# Patient Record
Sex: MALE | Race: WHITE | NOT HISPANIC OR LATINO | Employment: OTHER | URBAN - METROPOLITAN AREA
[De-identification: names, ages, dates, MRNs, and addresses within clinical notes are randomized per-mention and may not be internally consistent; named-entity substitution may affect disease eponyms.]

---

## 2017-07-17 ENCOUNTER — PATIENT MESSAGE (OUTPATIENT)
Dept: PSYCHIATRY | Facility: CLINIC | Age: 82
End: 2017-07-17

## 2017-07-18 RX ORDER — ESCITALOPRAM OXALATE 20 MG/1
20 TABLET ORAL DAILY
Qty: 30 TABLET | Refills: 1 | Status: SHIPPED | OUTPATIENT
Start: 2017-07-18 | End: 2017-09-13 | Stop reason: SDUPTHER

## 2017-07-18 RX ORDER — ALPRAZOLAM 0.5 MG/1
0.5 TABLET ORAL 2 TIMES DAILY PRN
Qty: 60 TABLET | Refills: 1 | Status: SHIPPED | OUTPATIENT
Start: 2017-07-18 | End: 2017-09-13 | Stop reason: SDUPTHER

## 2017-07-18 RX ORDER — ESCITALOPRAM OXALATE 20 MG/1
TABLET ORAL
Qty: 90 TABLET | Refills: 1 | OUTPATIENT
Start: 2017-07-18

## 2017-09-13 ENCOUNTER — HOSPITAL ENCOUNTER (OUTPATIENT)
Dept: CARDIOLOGY | Facility: CLINIC | Age: 82
Discharge: HOME | End: 2017-09-13
Payer: COMMERCIAL

## 2017-09-13 ENCOUNTER — OFFICE VISIT (OUTPATIENT)
Dept: PSYCHIATRY | Facility: CLINIC | Age: 82
End: 2017-09-13
Payer: COMMERCIAL

## 2017-09-13 VITALS
WEIGHT: 151 LBS | SYSTOLIC BLOOD PRESSURE: 172 MMHG | HEIGHT: 72 IN | HEART RATE: 82 BPM | BODY MASS INDEX: 20.45 KG/M2 | DIASTOLIC BLOOD PRESSURE: 87 MMHG

## 2017-09-13 DIAGNOSIS — F41.9 ANXIETY DISORDER, UNSPECIFIED TYPE: ICD-10-CM

## 2017-09-13 DIAGNOSIS — F33.1 MDD (MAJOR DEPRESSIVE DISORDER), RECURRENT EPISODE, MODERATE: ICD-10-CM

## 2017-09-13 DIAGNOSIS — Z79.899 MEDICATION MANAGEMENT: ICD-10-CM

## 2017-09-13 DIAGNOSIS — F41.1 GAD (GENERALIZED ANXIETY DISORDER): ICD-10-CM

## 2017-09-13 DIAGNOSIS — F33.1 MDD (MAJOR DEPRESSIVE DISORDER), RECURRENT EPISODE, MODERATE: Primary | ICD-10-CM

## 2017-09-13 PROCEDURE — 99214 OFFICE O/P EST MOD 30 MIN: CPT | Mod: S$GLB,,, | Performed by: PSYCHIATRY & NEUROLOGY

## 2017-09-13 PROCEDURE — 99999 PR PBB SHADOW E&M-EST. PATIENT-LVL III: CPT | Mod: PBBFAC,,, | Performed by: PSYCHIATRY & NEUROLOGY

## 2017-09-13 PROCEDURE — 93000 ELECTROCARDIOGRAM COMPLETE: CPT | Mod: S$GLB,,, | Performed by: INTERNAL MEDICINE

## 2017-09-13 PROCEDURE — 3008F BODY MASS INDEX DOCD: CPT | Mod: S$GLB,,, | Performed by: PSYCHIATRY & NEUROLOGY

## 2017-09-13 PROCEDURE — 1159F MED LIST DOCD IN RCRD: CPT | Mod: S$GLB,,, | Performed by: PSYCHIATRY & NEUROLOGY

## 2017-09-13 RX ORDER — MIRTAZAPINE 15 MG/1
15 TABLET, FILM COATED ORAL NIGHTLY
Qty: 30 TABLET | Refills: 3 | Status: SHIPPED | OUTPATIENT
Start: 2017-09-13 | End: 2017-11-30 | Stop reason: SDUPTHER

## 2017-09-13 RX ORDER — ALPRAZOLAM 0.5 MG/1
0.5 TABLET ORAL 2 TIMES DAILY PRN
Qty: 60 TABLET | Refills: 3 | Status: SHIPPED | OUTPATIENT
Start: 2017-09-13 | End: 2020-05-28

## 2017-09-13 RX ORDER — ESCITALOPRAM OXALATE 20 MG/1
20 TABLET ORAL DAILY
Qty: 90 TABLET | Refills: 1 | Status: SHIPPED | OUTPATIENT
Start: 2017-09-13 | End: 2017-11-30 | Stop reason: SDUPTHER

## 2017-09-13 NOTE — PATIENT INSTRUCTIONS
Start Remeron (mirtazapine) 15 mg one tablet at bedtime.  Continue Lexapro 20 mg one tablet daily.  You may use Xanax 0.5 mg up to twice daily as needed for anxiety or insomnia.  Return to clinic in about 2 months for follow up appt.

## 2017-09-13 NOTE — PROGRESS NOTES
"Outpatient Psychiatry Follow-Up Visit (MD/NP)    9/13/2017    Clinical Status of Patient:  Outpatient (Ambulatory)    Session Length:  30 minutes (E&M)      Chief Complaint:  Aleksandra Jacques is a 82 y.o. male who presents today for follow-up of anxiety, panic attacks, depression.    Met with patient and spouse.      Interval History and Content of Current Session:  Interim Events/Subjective Report/Content of Current Session: First appointment with me since 3/2/2015 (> 2 years).    "I am doing OK".  He has been taking Lexapro 20 mg/day in AM -- denies AE's to his knowledge.  He had tapered and stopped the Lexapro just after he saw me last in 2015.  He restarted it about 8 weeks ago because he felt his depression was getting worse.     He had contacted me recently about fact he has not been sleeping through the night.  I told him he could take an extra dose of Xanax in the early AM hours if needed.  He has been taking Xanax 0.5 mg one tablet before bedtime and second one around 3 AM.  It takes him about 1/2 hour to resume sleep.  He is able to go back to sleep for a couple of hours.  He denies feeling excessively drowsy when he rises in the AM around 5 - 6 AM.  So, he has had to take 2 Xanax tablets every night.    He has some periods of near tearfulness, but not actual crying spells (wife has even encouraged him to cry at times).  They deny excessive irritability or anger.     Current SIGECAPS:    Sleep -- as above  Interests -- "just what I have to do"; will do some odd jobs around the house.  This year he hired someone to come over to do his lawn.      Guilt -- "I can't think about anything specific"    Energy -- "pretty good"; wife thinks he is often tired and she must often push him to do things    Concentration -- "all right"   Appetite -- "a little low"; 16# wt loss since 3/2015.   Psychomotor -- somewhat retarded    Suicidal ideation -- denies    He wants to live for his wife, children and grandchildren.  " "Their grandchildren range from 15 - 21.  Wife and pt like to take cruises.  They try to go at least once a year on a cruise.    He realizes he has some ST memory loss.  Pt and wife do not see it as much of a problem at this time.   Wife states he does some callisthenics daily and they walk a couple of times a week.    Finances are OK.  Bills are being paid.      Also, when discussing most recent EKG, pt stated he had "skipped" heartbeat in 2014, which is why he had the EKG done in the ED.  His brother was suffering from heart disease and told pt about his Sx.  Pt stated he then realized he was having some of these Sx (occasional skipped heartbeat), so the went to ED.  He states the doctor in the ED told him his EKG was fine (he actually had 2 PVC's noted in 6 recorded beats on the EKG).  Pt DENIES having ANY recent Sx that would be concerning for any underlying heart disease.    His brother was also a lifelong smoker; pt has never smoked.     Psychotherapy:  N/A        Review of Systems   · PSYCHIATRIC: Pertinant items are noted in the narrative.  · CONSTITUTIONAL: Slight recent weight loss.   · MUSCULOSKELETAL: No significant pain or stiffness of the joints.  · NEUROLOGIC: No weakness, sensory changes, seizures, confusion, memory loss, tremor or other abnormal movements.  · ENDOCRINE: No polydipsia or polyuria.  · INTEGUMENTARY: No rashes or lacerations.  · EYES: No exophthalmos, jaundice or blindness.  · ENT: No dizziness, tinnitus or hearing loss.  · RESPIRATORY: No shortness of breath or dyspnea on exertion.  · CARDIOVASCULAR: No tachycardia, chest pain, recent palpitations/"skipped" heart beats.  · GASTROINTESTINAL: No nausea, vomiting, pain, constipation or diarrhea.  · GENITOURINARY: No frequency, dysuria.      Past Medical, Family and Social History: The patient's past medical, family and social history have been reviewed and updated as appropriate within the electronic medical record -- see encounter notes " "including my initial eval from 7/2/2014.    Compliance:  Yes.       Side effects: None    Risk Parameters:  Patient reports no suicidal ideation  Patient reports no homicidal ideation  Patient reports no self-injurious behavior  Patient reports no violent behavior    Exam (detailed: at least 9 elements; comprehensive: all 15 elements)   Constitutional  Vitals:  Most recent vital signs, dated less than 90 days prior to this appointment, were reviewed.     Vitals - 1 value per visit 9/2/2014 3/2/2015 9/13/2017   SYSTOLIC 153* 156* 172*   DIASTOLIC 71 76 87   PULSE 84 82 82   TEMPERATURE      RESPIRATIONS      SPO2      Weight (lb) 161 167 151   Weight (kg) 73.029 75.751 68.493   HEIGHT 6' 0" 6' 0" 6' 0"   BODY MASS INDEX 21.83 22.64 20.48   VISIT REPORT      Pain Score         *pt states he thinks he has "white coat hypertension"; his blood pressures have always been higher compared to outside the physicians' office     General:  unremarkable, age appropriate, normal weight, well dressed, neatly groomed, friendly, cooperative, good eye contact, engaging     Musculoskeletal  Muscle Strength/Tone:  not examined   Gait & Station:  non-ataxic     Psychiatric  Speech:  no latency; no press, spontaneous, good articulation   Mood & Affect:  euthymic  congruent and appropriate   Thought Process:  goal-directed, logical   Associations:  intact   Thought Content:  normal, no suicidality, no homicidality, delusions, or paranoia   Insight:  intact   Judgement: behavior is adequate to circumstances   Orientation:  grossly intact   Memory: intact for content of interview   Language: grossly intact   Attention Span & Concentration:  able to focus   Fund of Knowledge:  intact and appropriate to age and level of education     Assessment and Diagnosis   Status/Progress: Based on the examination today, the patient's problem(s) is/are improved.  New problems have been presented today.   Co-morbidities are not complicating management of " the primary condition.  There are no active rule-out diagnoses for this patient at this time.     Impression:   Major Depressive Disorder, recurrent, moderate (not responding adequately to Lexapro at therapeutic dose)  Generalized Anxiety Disorder     Intervention/Counseling/Treatment Plan   Medication Management:   (Re-)Start Remeron (mirtazapine) 15 mg one tablet at bedtime.  Continue Lexapro 20 mg one tablet daily.  Pt may use Xanax 0.5 mg up to twice daily as needed for anxiety or insomnia.  Pt told to avoid using unless necessary.   Return to clinic in about 2 months for follow up appt.     Additional Note:  Pt needs an EKG based on report from 2014 and being on Lexapro.  EKG ordered -- will direct pt to have this test done today and will contact pt later with the results.    Pt also needs to see Dr. Armaan Weiss for routine medical checkup.  Has not seen PCP in over 3 years.     Return to Clinic: 6 months, or sooner prn.

## 2017-09-14 RX ORDER — MIRTAZAPINE 15 MG/1
TABLET, FILM COATED ORAL
Qty: 90 TABLET | Refills: 3 | OUTPATIENT
Start: 2017-09-14

## 2017-11-30 ENCOUNTER — OFFICE VISIT (OUTPATIENT)
Dept: PSYCHIATRY | Facility: CLINIC | Age: 82
End: 2017-11-30
Payer: COMMERCIAL

## 2017-11-30 VITALS
DIASTOLIC BLOOD PRESSURE: 79 MMHG | SYSTOLIC BLOOD PRESSURE: 167 MMHG | BODY MASS INDEX: 21.92 KG/M2 | HEIGHT: 72 IN | HEART RATE: 88 BPM | WEIGHT: 161.81 LBS

## 2017-11-30 DIAGNOSIS — F33.1 MDD (MAJOR DEPRESSIVE DISORDER), RECURRENT EPISODE, MODERATE: ICD-10-CM

## 2017-11-30 DIAGNOSIS — F41.1 GENERALIZED ANXIETY DISORDER: ICD-10-CM

## 2017-11-30 DIAGNOSIS — F33.0 MDD (MAJOR DEPRESSIVE DISORDER), RECURRENT EPISODE, MILD: Primary | ICD-10-CM

## 2017-11-30 PROCEDURE — 99999 PR PBB SHADOW E&M-EST. PATIENT-LVL III: CPT | Mod: PBBFAC,,, | Performed by: PSYCHIATRY & NEUROLOGY

## 2017-11-30 PROCEDURE — 99214 OFFICE O/P EST MOD 30 MIN: CPT | Mod: S$GLB,,, | Performed by: PSYCHIATRY & NEUROLOGY

## 2017-11-30 RX ORDER — ESCITALOPRAM OXALATE 20 MG/1
20 TABLET ORAL DAILY
Qty: 90 TABLET | Refills: 1 | Status: SHIPPED | OUTPATIENT
Start: 2017-11-30 | End: 2018-06-04 | Stop reason: SDUPTHER

## 2017-11-30 RX ORDER — MIRTAZAPINE 7.5 MG/1
7.5 TABLET, FILM COATED ORAL NIGHTLY
Qty: 90 TABLET | Refills: 1 | Status: SHIPPED | OUTPATIENT
Start: 2017-11-30 | End: 2020-05-28

## 2017-11-30 NOTE — PROGRESS NOTES
"Outpatient Psychiatry Follow-Up Visit (MD/NP)    11/30/2017    Clinical Status of Patient:  Outpatient (Ambulatory)    Session Length:  30 minutes (E&M)      Chief Complaint:  Aleksandra Jacques is a 82 y.o. male who presents today for follow-up of anxiety, panic attacks, depression.    Met with patient and spouse.      Interval History and Content of Current Session:  Interim Events/Subjective Report/Content of Current Session: First appointment with me since 9/13/2017.   Medication plan at last appt:  "(Re-)Start Remeron (mirtazapine) 15 mg one tablet at bedtime.  Continue Lexapro 20 mg one tablet daily.  Pt may use Xanax 0.5 mg up to twice daily as needed for anxiety or insomnia.  Pt told to avoid using unless necessary.   Return to clinic in about 2 months for follow up appt."     He states he restarted the mirtazapine.  He has been taking just 1/4 tablet (3.75 mg) around 9 pm.  He is able to fall asleep and sleeps until around 5 am.  He and his wife attend [employer] every weekday at 6:15 am.  They do not need to get up as early on the weekends, as they attend the [employer] service in the evening.    He denies being oversedated in the AM if he takes just 1/4 of a 15 mg tablet.     He and his wife state he has been feeling better overall.  He denies any AE's to the mirtazapine besides being slightly more drowsy when awakening, but this passes fairly quickly.    He has NOT had to take any Xanax since his last visit with me on 9/13/17.     He has been staying busy; he is currently completing the repainting of his house.      They have a 9 day cruise scheduled next week to the [facility].  They are both looking forward to it.      He and his wife state his blood pressures OUTSIDE physicians' offices have been running lower -- he usually takes it in the AM's when he is relaxed.      Current SIGECAPS:    Sleep -- improved; needs less Xanax to help relax and sleep  Interests -- more motivated; he will do some odd jobs around " "the house.  This year he hired someone to come over to do his lawn.      Guilt -- denies excessive guilt    Energy -- "pretty good"; less tired overall   Concentration -- "all right"   Appetite -- "a little low"; 16# wt loss since 3/2015.   Psychomotor -- normal for age    Suicidal ideation -- denies hopelessness or SI  He wants to live for his wife, children and grandchildren.  Their grandchildren range are in their teens and twenties.  Wife and pt like to take cruises.  They try to go at least once a year on a cruise.    He realizes he has some ST memory loss.  Pt and wife do not see it as much of a problem at this time.   Wife states he does some callisthenics daily and they walk a couple of times a week.    Finances are OK.  Bills are being paid on time.      Psychotherapy:  N/A        Review of Systems   · PSYCHIATRIC: Pertinant items are noted in the narrative.  · CONSTITUTIONAL: Slight recent weight gain.   · MUSCULOSKELETAL: No significant pain or stiffness of the joints.  · NEUROLOGIC: No weakness, sensory changes, seizures, confusion, memory loss, tremor or other abnormal movements.  · ENDOCRINE: No polydipsia or polyuria.  · INTEGUMENTARY: No rashes or lacerations.  · EYES: No exophthalmos, jaundice or blindness.  · ENT: No dizziness, tinnitus or hearing loss.  · RESPIRATORY: No shortness of breath or dyspnea on exertion.  · CARDIOVASCULAR: No tachycardia, chest pain, recent palpitations/"skipped" heart beats.  · GASTROINTESTINAL: No nausea, vomiting, pain, constipation or diarrhea.  · GENITOURINARY: No frequency, dysuria.      Past Medical, Family and Social History: The patient's past medical, family and social history have been reviewed and updated as appropriate within the electronic medical record -- see encounter notes including my initial eval from 7/2/2014.    Compliance:  Yes.       Side effects: None    Risk Parameters:  Patient reports no suicidal ideation  Patient reports no homicidal " "ideation  Patient reports no self-injurious behavior  Patient reports no violent behavior    Exam (detailed: at least 9 elements; comprehensive: all 15 elements)   Constitutional  Vitals:  Most recent vital signs, dated less than 90 days prior to this appointment, were reviewed.     Vitals - 1 value per visit 3/2/2015 9/13/2017 11/30/2017   SYSTOLIC 156* 172* 167*   DIASTOLIC 76 87 79   PULSE 82 82 88   TEMPERATURE      RESPIRATIONS      SPO2      Weight (lb) 167 151 161.8   Weight (kg) 75.751 68.493 73.392   HEIGHT 6' 0" 6' 0" 6' 0"   BODY MASS INDEX 22.64 20.48 21.94   VISIT REPORT      Pain Score       *pt states he thinks he has "white coat hypertension"; his blood pressures have always been higher compared to outside the physicians' office    Vitals - 1 value per visit 5/25/2014 7/2/2014 9/2/2014   SYSTOLIC 128 162 153   DIASTOLIC 80 75 71   PULSE 72 72 84   TEMPERATURE      RESPIRATIONS      SPO2 97     Weight (lb) 149.7 162 161   Weight (kg) 67.903 73.483 73.029   HEIGHT 6' 0" 6' 0" 6' 0"   BODY MASS INDEX 20.3 21.97 21.83   VISIT REPORT      Pain Score  0          General:  unremarkable, age appropriate, normal weight, well dressed, neatly groomed, friendly, cooperative, good eye contact, engaging     Musculoskeletal  Muscle Strength/Tone:  not examined   Gait & Station:  non-ataxic     Psychiatric  Speech:  no latency; no press, spontaneous, good articulation   Mood & Affect:  euthymic  congruent and appropriate   Thought Process:  goal-directed, logical   Associations:  intact   Thought Content:  normal, no suicidality, no homicidality, delusions, or paranoia   Insight:  intact   Judgement: behavior is adequate to circumstances   Orientation:  grossly intact   Memory: intact for content of interview   Language: grossly intact   Attention Span & Concentration:  able to focus   Fund of Knowledge:  intact and appropriate to age and level of education     EKG (9/13/17):    Test Reason : F33.1  Vent. Rate : 079 " "BPM     Atrial Rate : 079 BPM     P-R Int : 166 ms          QRS Dur : 102 ms      QT Int : 374 ms       P-R-T Axes : 068 075 063 degrees     QTc Int : 428 ms  Sinus rhythm with frequent Premature ventricular complexes  Otherwise normal ECG  When compared with ECG of 18-MAY-2014 08:19,  No significant change was found  Confirmed by TITO WETZEL MD ([id number]) on 9/13/2017 4:53:01 PM    From previous session:  Also, when discussing most recent EKG, pt stated he had "skipped" heartbeat in 2014, which is why he had the EKG done in the ED.  His brother was suffering from heart disease and told pt about his Sx.  Pt stated he then realized he was having some of these Sx (occasional skipped heartbeat), so the went to ED.  He states the doctor in the ED told him his EKG was fine (he actually had 2 PVC's noted in 6 recorded beats on the EKG).  Pt DENIES having ANY recent Sx that would be concerning for any underlying heart disease.    His brother was also a lifelong smoker; pt has never smoked.      Assessment and Diagnosis   Status/Progress: Based on the examination today, the patient's problem(s) is/are improved.  New problems have not been presented today.   Co-morbidities are not complicating management of the primary condition.  There are no active rule-out diagnoses for this patient at this time.     Impression:   Major Depressive Disorder, recurrent, mild  Generalized Anxiety Disorder     Intervention/Counseling/Treatment Plan   Medication Management:   Continue Remeron (mirtazapine) 15 mg 1/4 tablet at bedtime (will change dosing to 7.5 mg so pt can conveniently cut pills in 1/2; he can take more PRN).  He was encouraged to take a minimum of 1/2 tablet (3.75 mg) nightly.    Continue Lexapro 20 mg one tablet daily.  Pt may use Xanax 0.5 mg up to twice daily as needed for anxiety or insomnia, but pt told to avoid using unless necessary.     Additional Note:  Pt needs an EKG based on report from 2014 and being on Lexapro.  " EKG ordered -- will direct pt to have this test done today and will contact pt later with the results.    Pt also needs to see Dr. Armaan Weiss for routine medical checkup.  Has not seen PCP in over 3 years.     Return to Clinic: 6 months, or sooner prn.

## 2017-12-19 ENCOUNTER — OFFICE VISIT (OUTPATIENT)
Dept: INTERNAL MEDICINE | Facility: CLINIC | Age: 82
End: 2017-12-19
Payer: COMMERCIAL

## 2017-12-19 VITALS
WEIGHT: 165.56 LBS | DIASTOLIC BLOOD PRESSURE: 70 MMHG | BODY MASS INDEX: 22.43 KG/M2 | SYSTOLIC BLOOD PRESSURE: 130 MMHG | HEIGHT: 72 IN

## 2017-12-19 DIAGNOSIS — R01.1 MURMUR, HEART: Primary | ICD-10-CM

## 2017-12-19 DIAGNOSIS — F32.A DEPRESSION, UNSPECIFIED DEPRESSION TYPE: ICD-10-CM

## 2017-12-19 DIAGNOSIS — R22.1 LOCALIZED SWELLING, MASS AND LUMP, NECK: ICD-10-CM

## 2017-12-19 DIAGNOSIS — Z00.00 PREVENTATIVE HEALTH CARE: ICD-10-CM

## 2017-12-19 DIAGNOSIS — M67.40 GANGLION: ICD-10-CM

## 2017-12-19 PROCEDURE — 99387 INIT PM E/M NEW PAT 65+ YRS: CPT | Mod: S$GLB,,, | Performed by: FAMILY MEDICINE

## 2017-12-19 PROCEDURE — 99999 PR PBB SHADOW E&M-EST. PATIENT-LVL III: CPT | Mod: PBBFAC,,, | Performed by: FAMILY MEDICINE

## 2017-12-19 NOTE — PROGRESS NOTES
Subjective:       Patient ID: Aleksandra Jacques is a 82 y.o. male.    Chief Complaint: No chief complaint on file.  Aleksandra Jacques 82 y.o. male is here for office visit to review care and physical exam, seen a few times in the past, last time about three years ago.  Has been doing well, using meds for depression, controled.  Seeing Dr Ames for Prostate health, would like PSA with labs today.  Not too much LUTs.  Apparently had TURP years ago.      HPI  Review of Systems   Constitutional: Negative for activity change, appetite change, fatigue, fever and unexpected weight change.   HENT: Negative for congestion, hearing loss, postnasal drip, rhinorrhea and trouble swallowing.    Eyes: Negative for pain, discharge and visual disturbance.   Respiratory: Negative for cough, choking, chest tightness, shortness of breath and wheezing.    Cardiovascular: Negative for chest pain, palpitations and leg swelling.   Gastrointestinal: Negative for abdominal pain, blood in stool, constipation, diarrhea and vomiting.   Endocrine: Negative for polydipsia and polyuria.   Genitourinary: Negative for difficulty urinating, dysuria, flank pain, hematuria and urgency.   Musculoskeletal: Negative for arthralgias, back pain, joint swelling and neck pain.   Skin: Negative for color change and rash.   Neurological: Negative for dizziness, tremors, syncope, weakness, numbness and headaches.   Psychiatric/Behavioral: Negative for agitation, confusion and dysphoric mood. The patient is not hyperactive.        Objective:      Physical Exam   Constitutional: He is oriented to person, place, and time. He appears well-developed and well-nourished.   HENT:   Head: Normocephalic.   Eyes: EOM are normal. Pupils are equal, round, and reactive to light.   Neck: Normal range of motion. Neck supple. No thyromegaly present.   Cardiovascular: Normal rate.  Exam reveals no gallop and no friction rub.    Murmur heard.  Very early RUSB murmur    Pulmonary/Chest: Effort normal. No respiratory distress. He has no wheezes.   Abdominal: Soft. Bowel sounds are normal. He exhibits no mass. There is no tenderness.   Musculoskeletal: He exhibits no edema or tenderness.   Lymphadenopathy:     He has no cervical adenopathy.   Neurological: He is alert and oriented to person, place, and time. He has normal reflexes. No cranial nerve deficit.   Skin: Skin is warm. No rash noted.   Psychiatric: He has a normal mood and affect. His behavior is normal.       Assessment:       No diagnosis found.    Plan:       Aleksandra was seen today for annual exam.    Diagnoses and all orders for this visit:    [facility]  -     Comprehensive metabolic panel; Future  -     Lipid panel; Future  -     CBC auto differential; Future  -     Hemoglobin A1c; Future  -     PSA, Screening; Future  -     TSH; Future  -     Fecal Immunochemical Test (iFOBT); Future    Depression, unspecified depression type  -     CBC auto differential; Future  -     TSH; Future

## 2017-12-20 ENCOUNTER — LAB VISIT (OUTPATIENT)
Dept: LAB | Facility: HOSPITAL | Age: 82
End: 2017-12-20
Attending: FAMILY MEDICINE
Payer: COMMERCIAL

## 2017-12-20 DIAGNOSIS — Z00.00 PREVENTATIVE HEALTH CARE: ICD-10-CM

## 2017-12-20 DIAGNOSIS — F32.A DEPRESSION, UNSPECIFIED DEPRESSION TYPE: ICD-10-CM

## 2017-12-20 LAB
ALBUMIN SERPL BCP-MCNC: 3.7 G/DL
ALP SERPL-CCNC: 46 U/L
ALT SERPL W/O P-5'-P-CCNC: 20 U/L
ANION GAP SERPL CALC-SCNC: 4 MMOL/L
AST SERPL-CCNC: 23 U/L
BASOPHILS # BLD AUTO: 0.09 K/UL
BASOPHILS NFR BLD: 1.5 %
BILIRUB SERPL-MCNC: 0.6 MG/DL
BUN SERPL-MCNC: 21 MG/DL
CALCIUM SERPL-MCNC: 9.3 MG/DL
CHLORIDE SERPL-SCNC: 106 MMOL/L
CHOLEST SERPL-MCNC: 155 MG/DL
CHOLEST/HDLC SERPL: 3.6 {RATIO}
CO2 SERPL-SCNC: 31 MMOL/L
COMPLEXED PSA SERPL-MCNC: 0.91 NG/ML
CREAT SERPL-MCNC: 0.9 MG/DL
DIFFERENTIAL METHOD: ABNORMAL
EOSINOPHIL # BLD AUTO: 0.3 K/UL
EOSINOPHIL NFR BLD: 4.2 %
ERYTHROCYTE [DISTWIDTH] IN BLOOD BY AUTOMATED COUNT: 12.5 %
EST. GFR  (AFRICAN AMERICAN): >60 ML/MIN/1.73 M^2
EST. GFR  (NON AFRICAN AMERICAN): >60 ML/MIN/1.73 M^2
ESTIMATED AVG GLUCOSE: 91 MG/DL
GLUCOSE SERPL-MCNC: 95 MG/DL
HBA1C MFR BLD HPLC: 4.8 %
HCT VFR BLD AUTO: 42.1 %
HDLC SERPL-MCNC: 43 MG/DL
HDLC SERPL: 27.7 %
HGB BLD-MCNC: 13.4 G/DL
IMM GRANULOCYTES # BLD AUTO: 0.02 K/UL
IMM GRANULOCYTES NFR BLD AUTO: 0.3 %
LDLC SERPL CALC-MCNC: 103.8 MG/DL
LYMPHOCYTES # BLD AUTO: 1.2 K/UL
LYMPHOCYTES NFR BLD: 20.5 %
MCH RBC QN AUTO: 31.2 PG
MCHC RBC AUTO-ENTMCNC: 31.8 G/DL
MCV RBC AUTO: 98 FL
MONOCYTES # BLD AUTO: 0.6 K/UL
MONOCYTES NFR BLD: 10 %
NEUTROPHILS # BLD AUTO: 3.7 K/UL
NEUTROPHILS NFR BLD: 63.5 %
NONHDLC SERPL-MCNC: 112 MG/DL
NRBC BLD-RTO: 0 /100 WBC
PLATELET # BLD AUTO: 226 K/UL
PMV BLD AUTO: 10.5 FL
POTASSIUM SERPL-SCNC: 4.5 MMOL/L
PROT SERPL-MCNC: 7.5 G/DL
RBC # BLD AUTO: 4.3 M/UL
SODIUM SERPL-SCNC: 141 MMOL/L
TRIGL SERPL-MCNC: 41 MG/DL
TSH SERPL DL<=0.005 MIU/L-ACNC: 2.64 UIU/ML
WBC # BLD AUTO: 5.89 K/UL

## 2017-12-20 PROCEDURE — 80053 COMPREHEN METABOLIC PANEL: CPT

## 2017-12-20 PROCEDURE — 83036 HEMOGLOBIN GLYCOSYLATED A1C: CPT

## 2017-12-20 PROCEDURE — 85025 COMPLETE CBC W/AUTO DIFF WBC: CPT

## 2017-12-20 PROCEDURE — 84443 ASSAY THYROID STIM HORMONE: CPT

## 2017-12-20 PROCEDURE — 36415 COLL VENOUS BLD VENIPUNCTURE: CPT | Mod: PO

## 2017-12-20 PROCEDURE — 84153 ASSAY OF PSA TOTAL: CPT

## 2017-12-20 PROCEDURE — 80061 LIPID PANEL: CPT

## 2017-12-21 ENCOUNTER — DOCUMENTATION ONLY (OUTPATIENT)
Dept: CARDIOLOGY | Facility: CLINIC | Age: 82
End: 2017-12-21

## 2017-12-21 ENCOUNTER — HOSPITAL ENCOUNTER (OUTPATIENT)
Dept: CARDIOLOGY | Facility: CLINIC | Age: 82
Discharge: HOME | End: 2017-12-21
Attending: FAMILY MEDICINE
Payer: COMMERCIAL

## 2017-12-21 DIAGNOSIS — Z00.00 PREVENTATIVE HEALTH CARE: ICD-10-CM

## 2017-12-21 DIAGNOSIS — R01.1 MURMUR, HEART: ICD-10-CM

## 2017-12-21 LAB
DIASTOLIC DYSFUNCTION: NO
ESTIMATED PA SYSTOLIC PRESSURE: 26.23
RETIRED EF AND QEF - SEE NOTES: 60 (ref 55–65)
TRICUSPID VALVE REGURGITATION: NORMAL

## 2017-12-21 PROCEDURE — 93352 ADMIN ECG CONTRAST AGENT: CPT | Mod: S$GLB,,, | Performed by: INTERNAL MEDICINE

## 2017-12-21 PROCEDURE — 93351 STRESS TTE COMPLETE: CPT | Mod: S$GLB,,, | Performed by: INTERNAL MEDICINE

## 2017-12-21 PROCEDURE — 93321 DOPPLER ECHO F-UP/LMTD STD: CPT | Mod: S$GLB,,, | Performed by: INTERNAL MEDICINE

## 2017-12-21 NOTE — PROGRESS NOTES
Patient identified by 2 identifiers. Denies previous reactions to blood transfusions and allergies reviewed.  Procedure explained & consent obtained.  22 g IV placed to Lt FA, flushed w/ 10cc NS pre & post contrast administration.  3cc Optison administered, echo images obtained.  Pt tolerated procedure well.  IV D/C'ed, preasure dsg applied.  Pt D/C'ed to home.

## 2018-01-04 ENCOUNTER — TELEPHONE (OUTPATIENT)
Dept: ORTHOPEDICS | Facility: CLINIC | Age: 83
End: 2018-01-04

## 2018-01-04 NOTE — TELEPHONE ENCOUNTER
Left voicemail for patient to call. We are IN Network with [facility], but he needs to call his insurance to verify whether we are preferred providers, as there are many different tiers and vary from plan to plan.

## 2018-01-04 NOTE — TELEPHONE ENCOUNTER
----- Message from Chidi Justin sent at 1/4/2018 12:50 PM CST -----  Contact: Pt  _x  1st Request  _  2nd Request  _  3rd Request        Who: GUERA BUSH [[id number]]    Why: Requesting a call back in regards to discussing whether or not physican is a preferred provider under insurance    Please return the call at earliest convenience. Thanks!    What Number to Call Back:[phone] (M)    When to Expect a call back: (Within 24 hours)

## 2018-01-04 NOTE — TELEPHONE ENCOUNTER
----- Message from Vivian Grider sent at 1/4/2018  3:37 PM CST -----  Contact: self  _x  1st Request  _  2nd Request  _  3rd Request    Who: pt    Why: pt returning call.please advise..    What Number to Call Back: [phone]    When to Expect a call back: (Before the end of the day)   -- if call after 3:00 call back will be tomorrow.

## 2018-01-08 ENCOUNTER — PATIENT MESSAGE (OUTPATIENT)
Dept: ORTHOPEDICS | Facility: CLINIC | Age: 83
End: 2018-01-08

## 2018-01-16 ENCOUNTER — LAB VISIT (OUTPATIENT)
Dept: LAB | Facility: HOSPITAL | Age: 83
End: 2018-01-16
Attending: FAMILY MEDICINE
Payer: COMMERCIAL

## 2018-01-16 DIAGNOSIS — Z00.00 PREVENTATIVE HEALTH CARE: ICD-10-CM

## 2018-01-16 LAB — HEMOCCULT STL QL IA: NEGATIVE

## 2018-01-16 PROCEDURE — 82274 ASSAY TEST FOR BLOOD FECAL: CPT

## 2018-01-22 ENCOUNTER — LAB VISIT (OUTPATIENT)
Dept: LAB | Facility: HOSPITAL | Age: 83
End: 2018-01-22
Attending: OTOLARYNGOLOGY
Payer: COMMERCIAL

## 2018-01-22 ENCOUNTER — OFFICE VISIT (OUTPATIENT)
Dept: OTOLARYNGOLOGY | Facility: CLINIC | Age: 83
End: 2018-01-22
Payer: COMMERCIAL

## 2018-01-22 VITALS
WEIGHT: 162.94 LBS | BODY MASS INDEX: 22.1 KG/M2 | SYSTOLIC BLOOD PRESSURE: 182 MMHG | HEART RATE: 80 BPM | DIASTOLIC BLOOD PRESSURE: 81 MMHG

## 2018-01-22 DIAGNOSIS — D17.0 LIPOMA OF NECK: ICD-10-CM

## 2018-01-22 DIAGNOSIS — D17.0 LIPOMA OF NECK: Primary | ICD-10-CM

## 2018-01-22 LAB
CREAT SERPL-MCNC: 0.9 MG/DL
EST. GFR  (AFRICAN AMERICAN): >60 ML/MIN/1.73 M^2
EST. GFR  (NON AFRICAN AMERICAN): >60 ML/MIN/1.73 M^2

## 2018-01-22 PROCEDURE — 36415 COLL VENOUS BLD VENIPUNCTURE: CPT

## 2018-01-22 PROCEDURE — 99243 OFF/OP CNSLTJ NEW/EST LOW 30: CPT | Mod: S$GLB,,, | Performed by: OTOLARYNGOLOGY

## 2018-01-22 PROCEDURE — 82565 ASSAY OF CREATININE: CPT

## 2018-01-22 PROCEDURE — 99999 PR PBB SHADOW E&M-EST. PATIENT-LVL III: CPT | Mod: PBBFAC,,, | Performed by: OTOLARYNGOLOGY

## 2018-01-22 RX ORDER — LIDOCAINE HYDROCHLORIDE 10 MG/ML
1 INJECTION, SOLUTION EPIDURAL; INFILTRATION; INTRACAUDAL; PERINEURAL ONCE
Status: CANCELLED | OUTPATIENT
Start: 2018-01-22 | End: 2018-01-22

## 2018-01-22 NOTE — PROGRESS NOTES
Chief Complaint   Patient presents with    Consult     mass/lump in left side of neck       HPI   82 y.o. male presents from Dr. Weiss for evaluation of a L neck mass.  He states that this mass has been present for ~10 years.  He has been previously told that this mass represents a lipoma.  He denies pain or significant change in size.  He denies SOB or throat pain or dysphagia.     Review of Systems   Constitutional: Negative for fatigue and unexpected weight change.   HENT: Per HPI.  Eyes: Negative for visual disturbance.   Respiratory: Negative for shortness of breath, hemoptysis   Cardiovascular: Negative for chest pain and palpitations.   Musculoskeletal: Negative for decreased ROM, back pain.   Skin: Negative for rash, sunburn, itching.   Neurological: Negative for dizziness and seizures.   Hematological: Negative for adenopathy. Does not bruise/bleed easily.   Endocrine: Negative for rapid weight loss/weight gain, heat/cold intolerance.     Past Medical History   Patient Active Problem List   Diagnosis    Preop exam for internal medicine    Anxiety state, unspecified    Depressive disorder, not elsewhere classified    Generalized anxiety disorder    Preventative health care    Depression           Past Surgical History   Past Surgical History:   Procedure Laterality Date    PROSTATE SURGERY      TESTICLE BIOPSY           Family History   Family History   Problem Relation Age of Onset    Arthritis Mother     Alcohol abuse Father     Cancer Father     Depression Father     Early death Father     Heart disease Father     Hypertension Father     Alcohol abuse Sister     Depression Sister     Dementia Sister     Depression Brother     Alcohol abuse Brother            Social History   .  Social History     Social History    Marital status:      Spouse name: N/A    Number of children: 3    Years of education: N/A     Occupational History    Not on file.     Social History Main Topics     Smoking status: Never Smoker    Smokeless tobacco: Never Used    Alcohol use Yes      Comment: social    Drug use: No    Sexual activity: Not on file     Other Topics Concern    Not on file     Social History Narrative    No narrative on file         Allergies   Review of patient's allergies indicates:   Allergen Reactions    No known allergies            Physical Exam     Vitals:    01/22/18 1307   BP: (!) 182/81   Pulse: 80         Body mass index is 22.1 kg/m².      General: AOx3, NAD   Respiratory:  Symmetric chest rise, normal effort  Right Ear: External Auditory Canal WNL,TM w/o masses/lesions/perforations.  Middle ear without evidence of effusion.   Left Ear: External Auditory Canal WNL,TM w/o masses/lesions/perforations.  Middle ear without evidence of effusion.   Nose: No gross nasal septal deviation. Inferior Turbinates WNL bilaterally. No septal perforation. No masses/lesions.   Oral Cavity:  Oral Tongue mobile, no lesions noted. Hard Palate WNL. No buccal or FOM lesions.  Oropharynx:  No masses/lesions of the posterior pharyngeal wall. Tonsillar fossa without lesions. Soft palate without masses. Midline uvula.   Neck: No scars.  No cervical lymphadenopathy, thyromegaly or thyroid nodules.  Normal range of motion.  ~3 cm soft, compressible mass L anterior level II consistent with a lipoma.  Face: House Brackmann I bilaterally.     Assessment/Plan  Problem List Items Addressed This Visit     None      Visit Diagnoses     Lipoma of neck    -  Primary    Relevant Orders    CT Soft Tissue Neck With Contrast    Creatinine, serum    Case Request Operating Room: EXCISION-MASS-NECK (Completed)      I offered him 2 options: observation vs resection.  He would like to undergo resection of this lesion.    I explained the benefits of this procedure would be a diagnosis and elimination of the lesion.  The indication for surgery is the presence of a neck mass.  We discussed that the chief alternatives is  observation, with potential for excisional biopsy if there was ever significant change.  The patient is interested in undergoing the procedure. The risks of left neck masss excisional biopsy include, but are not limited to, infection, bleeding, scarring, weakness of the shoulder due to injury to the spinal accessory nerve, weakness of the lip due to injury to the marginal mandibular nerve, collection of blood or tissue fluid under the skin requiring drainage, failure to achieve the diagnosis, and the need for additional procedures.  Time was allowed for questions, and all questions were answered to the patient's apparent satisfaction.      I ordered a CT of the neck to assist in characterizing this lesion.  Surgery is scheduled on 1/31/18.

## 2018-01-22 NOTE — LETTER
January 22, 2018      Armaan Weiss MD  [address]  [facility] [address]           [facility]  [address]  [facility] [address]  Phone: [phone]  Fax: [phone]          Patient: Aleksandra Jacques   MR Number: [id number]   YOB: 1935   Date of Visit: 1/22/2018       Dear Dr. Armaan Weiss:    Thank you for referring Aleksandra Jacques to me for evaluation. Attached you will find relevant portions of my assessment and plan of care.    If you have questions, please do not hesitate to call me. I look forward to following Aleksandra Jacques along with you.    Sincerely,    Jesús Hussein MD    Enclosure  CC:  No Recipients    If you would like to receive this communication electronically, please contact [email or web address] or [phone] to request more information on [facility] access.    For providers and/or their staff who would like to refer a patient to [facility], please contact us through our one-stop-shop provider referral line, [facility], at [phone].    If you feel you have received this communication in error or would no longer like to receive these types of communications, please e-mail [email or web address]

## 2018-01-23 ENCOUNTER — HOSPITAL ENCOUNTER (OUTPATIENT)
Dept: RADIOLOGY | Facility: HOSPITAL | Age: 83
Discharge: HOME | End: 2018-01-23
Attending: OTOLARYNGOLOGY
Payer: COMMERCIAL

## 2018-01-23 ENCOUNTER — ANESTHESIA EVENT (OUTPATIENT)
Dept: SURGERY | Facility: HOSPITAL | Age: 83
End: 2018-01-23
Payer: COMMERCIAL

## 2018-01-23 DIAGNOSIS — D17.0 LIPOMA OF NECK: ICD-10-CM

## 2018-01-23 PROCEDURE — 25500020 PHARM REV CODE 255: Performed by: OTOLARYNGOLOGY

## 2018-01-23 PROCEDURE — 70491 CT SOFT TISSUE NECK W/DYE: CPT | Mod: 26,,, | Performed by: RADIOLOGY

## 2018-01-23 PROCEDURE — 70491 CT SOFT TISSUE NECK W/DYE: CPT | Mod: TC

## 2018-01-23 RX ORDER — VIT C/E/ZN/COPPR/LUTEIN/ZEAXAN 250MG-90MG
2000 CAPSULE ORAL DAILY
COMMUNITY

## 2018-01-23 RX ORDER — ASPIRIN 81 MG/1
81 TABLET ORAL DAILY
COMMUNITY
End: 2020-05-28

## 2018-01-23 RX ADMIN — IOHEXOL 75 ML: 350 INJECTION, SOLUTION INTRAVENOUS at 10:01

## 2018-01-23 NOTE — ANESTHESIA PREPROCEDURE EVALUATION
Anesthesia Assessment: Preoperative EQUATION     Planned Procedure: Procedure(s) (LRB):  EXCISION-MASS-NECK (Left)  Requested Anesthesia Type:Local MAC  Surgeon: Jesús Hussein MD  Service: ENT  Known or anticipated Date of Surgery:1/31/2018     Surgeon notes: reviewed     Electronic QUestionnaire Assessment completed via nurse interview with patient.      NO AQ     Triage considerations:      The patient has no apparent active cardiac condition (No unstable coronary Syndrome such as severe unstable angina or recent [<1 month] myocardial infarction, decompensated CHF, severe valvular   disease or significant arrhythmia)     Previous anesthesia records:No problems and Not available-as a child-appendectomy, 13 years ago-prostate surgery     Last PCP note: within 3 months , within [facility]   12/19/17     Other important co-morbidities: Anxiety/Depression     Tests already available:  Available tests,  within 3 months , within [facility] .    12/2017 CBC, CMP, A1C, TSH, PSA, Lipid Panel, EKG/2-D Echo                            Instructions given. (See in Nurse's note)     Optimization:  Anesthesia Preop Clinic Assessment  Indicated-not required for this surgery                Plan:    Testing:  none needed                           Patient  has previously scheduled Medical Appointment: 1/23  CT Scan     Navigation: Tests Scheduled. none                         Straight Line to surgery.                          No tests, anesthesia preop clinic visit, or consult required.      Jaquelin Valladares RN                                               Electronically signed by Jaquelin Valladares RN at 1/23/2018  9:28 AM                                                                                                                  01/23/2018  Aleksandra Jacques is a 82 y.o., male.    Anesthesia Evaluation    I have reviewed the Patient Summary Reports.     I have reviewed the Medications.     Review of Systems  Anesthesia  Hx:  No problems with previous Anesthesia    EENT/Dental:   Neck Lipoma      Cardiovascular:   Hypertension    Psych:   Psychiatric History  Anxiety Disorder.  Depression.          Physical Exam  General:  Well nourished    Airway/Jaw/Neck:  Airway Findings: Mouth Opening: Normal Tongue: Normal  Mallampati: II  TM Distance: Normal, at least 6 cm      Dental:  Dental Findings: In tact   Chest/Lungs:  Chest/Lungs Findings: Clear to auscultation, Normal Respiratory Rate     Heart/Vascular:  Heart Findings: Rate: Normal  Rhythm: Regular Rhythm        Mental Status:  Mental Status Findings:  Cooperative, Alert and Oriented         Anesthesia Plan  Type of Anesthesia, risks & benefits discussed:  Anesthesia Type:  general  Patient's Preference: same   Intra-op Monitoring Plan: standard ASA monitors  Intra-op Monitoring Plan Comments:   Post Op Pain Control Plan: IV/PO Opioids PRN, per primary service following discharge from PACU and multimodal analgesia  Post Op Pain Control Plan Comments:   Induction:   IV  Beta Blocker:  Patient is not currently on a Beta-Blocker (No further documentation required).       Informed Consent: Patient understands risks and agrees with Anesthesia plan.  Questions answered. Anesthesia consent signed with patient.  ASA Score: 2     Day of Surgery Review of History & Physical:    H&P update referred to the surgeon.         Ready For Surgery From Anesthesia Perspective.

## 2018-01-23 NOTE — PRE ADMISSION SCREENING
Anesthesia Assessment: Preoperative EQUATION    Planned Procedure: Procedure(s) (LRB):  EXCISION-MASS-NECK (Left)  Requested Anesthesia Type:Local MAC  Surgeon: Jesús Hussein MD  Service: ENT  Known or anticipated Date of Surgery:1/31/2018    Surgeon notes: reviewed    Electronic QUestionnaire Assessment completed via nurse interview with patient.      NO AQ    Triage considerations:     The patient has no apparent active cardiac condition (No unstable coronary Syndrome such as severe unstable angina or recent [<1 month] myocardial infarction, decompensated CHF, severe valvular   disease or significant arrhythmia)    Previous anesthesia records:No problems and Not available    Last PCP note: within 3 months , within [facility]   12/19/17    Other important co-morbidities: Anxiety/Depression     Tests already available:  Available tests,  within 3 months , within [facility] .   12/2017  CBC, CMP, A1C, TSH, PSA, Lipid Panel, EKG/2-D Echo            Instructions given. (See in Nurse's note)    Optimization:  Anesthesia Preop Clinic Assessment  Indicated-not required for this surgery        Plan:    Testing:  none needed      Patient  has previously scheduled Medical Appointment: 1/23  CT Scan    Navigation: Tests Scheduled. none              Straight Line to surgery.               No tests, anesthesia preop clinic visit, or consult required.     Jaquelin Valladares RN

## 2018-01-30 ENCOUNTER — TELEPHONE (OUTPATIENT)
Dept: OTOLARYNGOLOGY | Facility: CLINIC | Age: 83
End: 2018-01-30

## 2018-01-31 ENCOUNTER — ANESTHESIA (OUTPATIENT)
Dept: SURGERY | Facility: HOSPITAL | Age: 83
End: 2018-01-31
Payer: COMMERCIAL

## 2018-01-31 ENCOUNTER — HOSPITAL ENCOUNTER (OUTPATIENT)
Facility: HOSPITAL | Age: 83
Discharge: HOME | End: 2018-01-31
Attending: OTOLARYNGOLOGY | Admitting: OTOLARYNGOLOGY
Payer: COMMERCIAL

## 2018-01-31 VITALS
HEART RATE: 84 BPM | TEMPERATURE: 98 F | WEIGHT: 160 LBS | BODY MASS INDEX: 21.67 KG/M2 | OXYGEN SATURATION: 99 % | SYSTOLIC BLOOD PRESSURE: 129 MMHG | RESPIRATION RATE: 18 BRPM | DIASTOLIC BLOOD PRESSURE: 61 MMHG | HEIGHT: 72 IN

## 2018-01-31 DIAGNOSIS — D17.0 LIPOMA OF NECK: Primary | ICD-10-CM

## 2018-01-31 PROCEDURE — C1729 CATH, DRAINAGE: HCPCS | Performed by: OTOLARYNGOLOGY

## 2018-01-31 PROCEDURE — 88304 TISSUE EXAM BY PATHOLOGIST: CPT | Mod: 26,,, | Performed by: PATHOLOGY

## 2018-01-31 PROCEDURE — 71000016 HC POSTOP RECOV ADDL HR: Performed by: OTOLARYNGOLOGY

## 2018-01-31 PROCEDURE — 71000033 HC RECOVERY, INTIAL HOUR: Performed by: OTOLARYNGOLOGY

## 2018-01-31 PROCEDURE — 63600175 PHARM REV CODE 636 W HCPCS: Performed by: NURSE ANESTHETIST, CERTIFIED REGISTERED

## 2018-01-31 PROCEDURE — 25000003 PHARM REV CODE 250: Performed by: OTOLARYNGOLOGY

## 2018-01-31 PROCEDURE — 88304 TISSUE EXAM BY PATHOLOGIST: CPT | Performed by: PATHOLOGY

## 2018-01-31 PROCEDURE — 25000003 PHARM REV CODE 250: Performed by: NURSE ANESTHETIST, CERTIFIED REGISTERED

## 2018-01-31 PROCEDURE — 63600175 PHARM REV CODE 636 W HCPCS: Performed by: OTOLARYNGOLOGY

## 2018-01-31 PROCEDURE — 36000707: Performed by: OTOLARYNGOLOGY

## 2018-01-31 PROCEDURE — 37000008 HC ANESTHESIA 1ST 15 MINUTES: Performed by: OTOLARYNGOLOGY

## 2018-01-31 PROCEDURE — 37000009 HC ANESTHESIA EA ADD 15 MINS: Performed by: OTOLARYNGOLOGY

## 2018-01-31 PROCEDURE — 21554 EXC NECK TUM DEEP 5 CM/>: CPT | Mod: ,,, | Performed by: OTOLARYNGOLOGY

## 2018-01-31 PROCEDURE — 71000015 HC POSTOP RECOV 1ST HR: Performed by: OTOLARYNGOLOGY

## 2018-01-31 PROCEDURE — D9220A PRA ANESTHESIA: Mod: ANES,,, | Performed by: ANESTHESIOLOGY

## 2018-01-31 PROCEDURE — 94761 N-INVAS EAR/PLS OXIMETRY MLT: CPT

## 2018-01-31 PROCEDURE — 27000221 HC OXYGEN, UP TO 24 HOURS

## 2018-01-31 PROCEDURE — 36000706: Performed by: OTOLARYNGOLOGY

## 2018-01-31 PROCEDURE — D9220A PRA ANESTHESIA: Mod: CRNA,,, | Performed by: NURSE ANESTHETIST, CERTIFIED REGISTERED

## 2018-01-31 RX ORDER — SODIUM CHLORIDE 0.9 % (FLUSH) 0.9 %
3 SYRINGE (ML) INJECTION
Status: DISCONTINUED | OUTPATIENT
Start: 2018-01-31 | End: 2018-01-31 | Stop reason: HOSPADM

## 2018-01-31 RX ORDER — CEFAZOLIN SODIUM 1 G/3ML
2 INJECTION, POWDER, FOR SOLUTION INTRAMUSCULAR; INTRAVENOUS
Status: COMPLETED | OUTPATIENT
Start: 2018-01-31 | End: 2018-01-31

## 2018-01-31 RX ORDER — HYDROCODONE BITARTRATE AND ACETAMINOPHEN 5; 325 MG/1; MG/1
1 TABLET ORAL EVERY 4 HOURS PRN
Qty: 15 TABLET | Refills: 0 | Status: SHIPPED | OUTPATIENT
Start: 2018-01-31 | End: 2020-05-28

## 2018-01-31 RX ORDER — LIDOCAINE HCL/PF 100 MG/5ML
SYRINGE (ML) INTRAVENOUS
Status: DISCONTINUED | OUTPATIENT
Start: 2018-01-31 | End: 2018-01-31

## 2018-01-31 RX ORDER — LIDOCAINE HYDROCHLORIDE 10 MG/ML
1 INJECTION, SOLUTION EPIDURAL; INFILTRATION; INTRACAUDAL; PERINEURAL ONCE
Status: COMPLETED | OUTPATIENT
Start: 2018-01-31 | End: 2018-01-31

## 2018-01-31 RX ORDER — CEPHALEXIN 500 MG/1
500 CAPSULE ORAL 4 TIMES DAILY
Qty: 28 CAPSULE | Refills: 0 | Status: SHIPPED | OUTPATIENT
Start: 2018-01-31 | End: 2018-02-07

## 2018-01-31 RX ORDER — PROPOFOL 10 MG/ML
VIAL (ML) INTRAVENOUS
Status: DISCONTINUED | OUTPATIENT
Start: 2018-01-31 | End: 2018-01-31

## 2018-01-31 RX ORDER — SODIUM CHLORIDE 9 MG/ML
INJECTION, SOLUTION INTRAVENOUS CONTINUOUS
Status: DISCONTINUED | OUTPATIENT
Start: 2018-01-31 | End: 2018-01-31 | Stop reason: HOSPADM

## 2018-01-31 RX ORDER — MEPERIDINE HYDROCHLORIDE 50 MG/ML
12.5 INJECTION INTRAMUSCULAR; INTRAVENOUS; SUBCUTANEOUS EVERY 10 MIN PRN
Status: DISCONTINUED | OUTPATIENT
Start: 2018-01-31 | End: 2018-01-31 | Stop reason: HOSPADM

## 2018-01-31 RX ORDER — HYDROCODONE BITARTRATE AND ACETAMINOPHEN 5; 325 MG/1; MG/1
1 TABLET ORAL ONCE
Status: COMPLETED | OUTPATIENT
Start: 2018-01-31 | End: 2018-01-31

## 2018-01-31 RX ORDER — ONDANSETRON 2 MG/ML
INJECTION INTRAMUSCULAR; INTRAVENOUS
Status: DISCONTINUED | OUTPATIENT
Start: 2018-01-31 | End: 2018-01-31

## 2018-01-31 RX ORDER — PHENYLEPHRINE HYDROCHLORIDE 10 MG/ML
INJECTION INTRAVENOUS
Status: DISCONTINUED | OUTPATIENT
Start: 2018-01-31 | End: 2018-01-31

## 2018-01-31 RX ORDER — HYDROCODONE BITARTRATE AND ACETAMINOPHEN 5; 325 MG/1; MG/1
TABLET ORAL
Status: DISCONTINUED
Start: 2018-01-31 | End: 2018-01-31 | Stop reason: HOSPADM

## 2018-01-31 RX ORDER — HYDROCODONE BITARTRATE AND ACETAMINOPHEN 5; 325 MG/1; MG/1
1 TABLET ORAL EVERY 4 HOURS PRN
Qty: 30 TABLET | Refills: 0 | Status: SHIPPED | OUTPATIENT
Start: 2018-01-31 | End: 2018-01-31

## 2018-01-31 RX ORDER — HALOPERIDOL 5 MG/ML
1 INJECTION INTRAMUSCULAR ONCE AS NEEDED
Status: DISCONTINUED | OUTPATIENT
Start: 2018-01-31 | End: 2018-01-31 | Stop reason: HOSPADM

## 2018-01-31 RX ORDER — SUCCINYLCHOLINE CHLORIDE 20 MG/ML
INJECTION INTRAMUSCULAR; INTRAVENOUS
Status: DISCONTINUED | OUTPATIENT
Start: 2018-01-31 | End: 2018-01-31

## 2018-01-31 RX ORDER — ACETAMINOPHEN 10 MG/ML
INJECTION, SOLUTION INTRAVENOUS
Status: DISCONTINUED | OUTPATIENT
Start: 2018-01-31 | End: 2018-01-31

## 2018-01-31 RX ORDER — ROCURONIUM BROMIDE 10 MG/ML
INJECTION, SOLUTION INTRAVENOUS
Status: DISCONTINUED | OUTPATIENT
Start: 2018-01-31 | End: 2018-01-31

## 2018-01-31 RX ORDER — LABETALOL HYDROCHLORIDE 5 MG/ML
INJECTION, SOLUTION INTRAVENOUS
Status: DISCONTINUED | OUTPATIENT
Start: 2018-01-31 | End: 2018-01-31

## 2018-01-31 RX ORDER — DEXAMETHASONE SODIUM PHOSPHATE 4 MG/ML
INJECTION, SOLUTION INTRA-ARTICULAR; INTRALESIONAL; INTRAMUSCULAR; INTRAVENOUS; SOFT TISSUE
Status: DISCONTINUED | OUTPATIENT
Start: 2018-01-31 | End: 2018-01-31

## 2018-01-31 RX ORDER — FENTANYL CITRATE 50 UG/ML
25 INJECTION, SOLUTION INTRAMUSCULAR; INTRAVENOUS EVERY 5 MIN PRN
Status: DISCONTINUED | OUTPATIENT
Start: 2018-01-31 | End: 2018-01-31 | Stop reason: HOSPADM

## 2018-01-31 RX ORDER — FENTANYL CITRATE 50 UG/ML
INJECTION, SOLUTION INTRAMUSCULAR; INTRAVENOUS
Status: DISCONTINUED | OUTPATIENT
Start: 2018-01-31 | End: 2018-01-31

## 2018-01-31 RX ORDER — LIDOCAINE HYDROCHLORIDE AND EPINEPHRINE 10; 10 MG/ML; UG/ML
INJECTION, SOLUTION INFILTRATION; PERINEURAL
Status: DISCONTINUED | OUTPATIENT
Start: 2018-01-31 | End: 2018-01-31 | Stop reason: HOSPADM

## 2018-01-31 RX ADMIN — FENTANYL CITRATE 100 MCG: 50 INJECTION, SOLUTION INTRAMUSCULAR; INTRAVENOUS at 11:01

## 2018-01-31 RX ADMIN — LIDOCAINE HYDROCHLORIDE 80 MG: 20 INJECTION, SOLUTION INTRAVENOUS at 11:01

## 2018-01-31 RX ADMIN — SODIUM CHLORIDE: 0.9 INJECTION, SOLUTION INTRAVENOUS at 08:01

## 2018-01-31 RX ADMIN — PHENYLEPHRINE HYDROCHLORIDE 100 MCG: 10 INJECTION INTRAVENOUS at 11:01

## 2018-01-31 RX ADMIN — ACETAMINOPHEN 1000 MG: 10 INJECTION, SOLUTION INTRAVENOUS at 11:01

## 2018-01-31 RX ADMIN — CEFAZOLIN 2 G: 330 INJECTION, POWDER, FOR SOLUTION INTRAMUSCULAR; INTRAVENOUS at 11:01

## 2018-01-31 RX ADMIN — EPHEDRINE SULFATE 10 MG: 50 INJECTION, SOLUTION INTRAMUSCULAR; INTRAVENOUS; SUBCUTANEOUS at 12:01

## 2018-01-31 RX ADMIN — LABETALOL HYDROCHLORIDE 10 MG: 5 INJECTION, SOLUTION INTRAVENOUS at 12:01

## 2018-01-31 RX ADMIN — FENTANYL CITRATE 50 MCG: 50 INJECTION, SOLUTION INTRAMUSCULAR; INTRAVENOUS at 11:01

## 2018-01-31 RX ADMIN — ROCURONIUM BROMIDE 5 MG: 10 INJECTION, SOLUTION INTRAVENOUS at 11:01

## 2018-01-31 RX ADMIN — HYDROCODONE BITARTRATE AND ACETAMINOPHEN 1 TABLET: 5; 325 TABLET ORAL at 01:01

## 2018-01-31 RX ADMIN — EPHEDRINE SULFATE 10 MG: 50 INJECTION, SOLUTION INTRAMUSCULAR; INTRAVENOUS; SUBCUTANEOUS at 11:01

## 2018-01-31 RX ADMIN — ONDANSETRON 4 MG: 2 INJECTION INTRAMUSCULAR; INTRAVENOUS at 12:01

## 2018-01-31 RX ADMIN — PHENYLEPHRINE HYDROCHLORIDE 200 MCG: 10 INJECTION INTRAVENOUS at 11:01

## 2018-01-31 RX ADMIN — DEXAMETHASONE SODIUM PHOSPHATE 8 MG: 4 INJECTION, SOLUTION INTRAMUSCULAR; INTRAVENOUS at 11:01

## 2018-01-31 RX ADMIN — SUCCINYLCHOLINE CHLORIDE 160 MG: 20 INJECTION, SOLUTION INTRAMUSCULAR; INTRAVENOUS at 11:01

## 2018-01-31 RX ADMIN — PROPOFOL 100 MG: 10 INJECTION, EMULSION INTRAVENOUS at 11:01

## 2018-01-31 RX ADMIN — SODIUM CHLORIDE, SODIUM GLUCONATE, SODIUM ACETATE, POTASSIUM CHLORIDE, MAGNESIUM CHLORIDE, SODIUM PHOSPHATE, DIBASIC, AND POTASSIUM PHOSPHATE: .53; .5; .37; .037; .03; .012; .00082 INJECTION, SOLUTION INTRAVENOUS at 12:01

## 2018-01-31 RX ADMIN — LIDOCAINE HYDROCHLORIDE 10 MG: 10 INJECTION, SOLUTION EPIDURAL; INFILTRATION; INTRACAUDAL; PERINEURAL at 08:01

## 2018-01-31 NOTE — BRIEF OP NOTE
[facility]  Brief Operative Note    SUMMARY     Surgery Date: 1/31/2018     Surgeon(s) and Role:     * Jesús Hussein MD - Primary     * Fabian Brooks MD - Resident - Assisting        Pre-op Diagnosis:  Lipoma of neck [D17.0]    Post-op Diagnosis:  Post-Op Diagnosis Codes:     * Lipoma of neck [D17.0]    Procedure(s) (LRB):  EXCISION-MASS-NECK (Left)    Anesthesia: Local MAC    Description of Procedure: left neck lipoma removed    Description of the findings of the procedure: see op note for details    Estimated Blood Loss: * No values recorded between 1/31/2018 11:52 AM and 1/31/2018 12:44 PM *         Specimens:   Specimen (12h ago through future)    Start     Ordered    01/31/18 1213  Specimen to Pathology - Surgery  Once     Comments:  1.) Left neck Lipoma - Permanent.      01/31/18 1213    01/31/18 1210  Specimen to Pathology - Surgery  Once     Comments:  1.) Left neck Lipoma - Permanent.      01/31/18 1210

## 2018-01-31 NOTE — PLAN OF CARE
Pt is AAOx4. VSS. NAD. IV discontinued. Discharge instructions given. Verbalized understanding. Discharged home with family .

## 2018-01-31 NOTE — ANESTHESIA POSTPROCEDURE EVALUATION
Anesthesia Post Evaluation    Patient: Aleksandra Jacques    Procedure(s) Performed: Procedure(s) (LRB):  EXCISION-MASS-NECK (Left)    Final Anesthesia Type: general  Patient location during evaluation: PACU  Patient participation: Yes- Able to Participate  Level of consciousness: awake and alert  Post-procedure vital signs: reviewed and stable  Pain management: adequate  Airway patency: patent  PONV status at discharge: No PONV  Anesthetic complications: no      Cardiovascular status: blood pressure returned to baseline  Respiratory status: unassisted, spontaneous ventilation and room air  Hydration status: euvolemic          Visit Vitals  /62   Pulse 87   Temp 36.8 °C (98.3 °F) (Oral)   Resp 18   Ht 6' (1.829 m)   Wt 72.6 kg (160 lb)   SpO2 98%   BMI 21.70 kg/m²       Pain/Senait Score: Pain Assessment Performed: Yes (1/31/2018  2:10 PM)  Presence of Pain: denies (1/31/2018  2:14 PM)  Pain Rating Prior to Med Admin: 0 (1/31/2018  2:10 PM)  Senait Score: 9 (1/31/2018  2:10 PM)  Modified Senait Score: 19 (1/31/2018  2:11 PM)

## 2018-01-31 NOTE — TRANSFER OF CARE
Anesthesia Transfer of Care Note    Patient: Aleksandra Jacques    Procedure(s) Performed: Procedure(s) (LRB):  EXCISION-MASS-NECK (Left)    Patient location: PACU    Anesthesia Type: general    Transport from OR: Transported from OR on 6-10 L/min O2 by face mask with adequate spontaneous ventilation    Post pain: adequate analgesia    Post assessment: no apparent anesthetic complications and tolerated procedure well    Post vital signs: stable    Level of consciousness: awake and alert    Nausea/Vomiting: no nausea/vomiting    Complications: none    Transfer of care protocol was followed      Last vitals:   Visit Vitals  BP (!) 147/70 (BP Location: Right arm, Patient Position: Lying)   Pulse 81   Temp 36.6 °C (97.9 °F) (Temporal)   Resp 17   Ht 6' (1.829 m)   Wt 72.6 kg (160 lb)   SpO2 100%   BMI 21.70 kg/m²

## 2018-01-31 NOTE — DISCHARGE INSTRUCTIONS
Incision Care  Remember: Follow-up visits allow your healthcare provider to make sure your incision is healing well. Be sure to keep your appointments.     Stitches (sutures), surgical staples, special strips of surgical tape, or surgical skin glue may be used to close incisions. They also help stop bleeding and speed healing. To help your incision heal, follow the tips on this handout.  Home care  Tips for home care include the following:  · Always wash your hands before touching your incision.  · Keep your incision clean and dry.  · Avoid doing things that could cause dirt or sweat to get on your incision.  · Dont pick at scabs. They help protect the wound.  · Keep your incision out of water.  · Take a sponge bath to avoid getting your incision wet, unless your healthcare provider tells you otherwise.  · Ask your provider when can you take a shower or bathe.  · Ask your provider about the best way to keep your incision dry when bathing or showering.  · Pat stitches dry if they get wet. Dont rub.  · Leave the bandage (dressing) in place until you are told to remove it or change it. Change it only as directed, using clean hands.  · After the first 12 hours, change your dressing every 24 hours, or as directed by your healthcare provider.  · Change your dressing if it gets wet or soiled.  Care for specific closures  Follow these guidelines unless your healthcare provider tells you otherwise:  · Stitches or staples. Once you no longer need to keep these dry, clean the wound daily. First remove the bandage using clean hands. Then wash the area gently with soap and warm water. Use a wet cotton swab to loosen and remove any blood or crust that forms. After cleaning, put a thin layer of antibiotic ointment on. Then put on a new bandage.  · Skin glue. Dont put liquid, ointment, or cream on your wound while the glue is in place. Avoid activities that cause heavy sweating. Protect the wound from sunlight. Do not scratch,  rub, or pick at the glue. Do not put tape directly over the glue. The glue should peel off within 5 to 10 days.  · Surgical tape. Keep the area dry. If it gets wet, blot the area dry with a clean towel. Surgical tape usually falls off within 7 to 10 days. If it has not fallen off after 10 days, contact your healthcare provider before taking it off yourself. If you are told to remove the tape, put mineral oil or petroleum jelly on a cotton ball. Gently rub the tape until it is removed.  Changing your dressing  Leave the dressing (bandage) in place until you are told to remove it or change it. Follow the instructions below unless told otherwise by your healthcare provider:  · Always wash your hands before changing your dressing.  · After the first 48 hours, the incision wound usually will have closed. At this point, leave the incision uncovered and open to the air. If the incision has not closed keep it covered.  · Cover your incision only if your clothing is rubbing it or causing irritation.  · Change your dressing if it gets wet or soiled.  Follow-up care  Follow up with your healthcare provider to ask how long sutures or staples should be left in place. Be sure to return for stitch or staple removal as directed. If dissolving stitches were used in your mouth, these will not need to be removed. They should fall out or dissolve on their own.  If tape closures were used, remove them yourself when your provider recommends if they have not fallen off on their own. If skin glue was used, the glue will wear off by itself.  When to seek medical care  Call your healthcare provider if you have any of the following:  · More pain, redness, swelling, bleeding, or foul-smelling discharge around the incision area  · Fever of 100.4°F (38ºC) or higher, or as directed by your healthcare provider  · Shaking chills  · Vomiting or nausea that doesn't go away  · Numbness, coldness, or tingling around the incision area, or changes in  skin color  · Opening of the sutures or wound  · Stitches or staples come apart or fall out or surgical tape falls off before 7 days or as directed by your healthcare provider

## 2018-02-02 ENCOUNTER — TELEPHONE (OUTPATIENT)
Dept: OTOLARYNGOLOGY | Facility: CLINIC | Age: 83
End: 2018-02-02

## 2018-02-02 ENCOUNTER — OFFICE VISIT (OUTPATIENT)
Dept: OTOLARYNGOLOGY | Facility: CLINIC | Age: 83
End: 2018-02-02
Payer: COMMERCIAL

## 2018-02-02 VITALS
TEMPERATURE: 98 F | DIASTOLIC BLOOD PRESSURE: 85 MMHG | BODY MASS INDEX: 23.14 KG/M2 | WEIGHT: 170.63 LBS | SYSTOLIC BLOOD PRESSURE: 180 MMHG | HEART RATE: 81 BPM

## 2018-02-02 DIAGNOSIS — D17.0 LIPOMA OF NECK: Primary | ICD-10-CM

## 2018-02-02 PROCEDURE — 99999 PR PBB SHADOW E&M-EST. PATIENT-LVL III: CPT | Mod: PBBFAC,,, | Performed by: NURSE PRACTITIONER

## 2018-02-02 PROCEDURE — 99024 POSTOP FOLLOW-UP VISIT: CPT | Mod: S$GLB,,, | Performed by: NURSE PRACTITIONER

## 2018-02-02 NOTE — TELEPHONE ENCOUNTER
----- Message from Heather Simmons sent at 2/2/2018  9:50 AM CST -----  Contact: patient  Please call above patient need to speak with you

## 2018-02-05 ENCOUNTER — OFFICE VISIT (OUTPATIENT)
Dept: OTOLARYNGOLOGY | Facility: CLINIC | Age: 83
End: 2018-02-05
Payer: COMMERCIAL

## 2018-02-05 VITALS
HEART RATE: 77 BPM | SYSTOLIC BLOOD PRESSURE: 152 MMHG | DIASTOLIC BLOOD PRESSURE: 76 MMHG | BODY MASS INDEX: 22.9 KG/M2 | WEIGHT: 168.88 LBS

## 2018-02-05 DIAGNOSIS — D17.0 LIPOMA OF NECK: Primary | ICD-10-CM

## 2018-02-05 PROCEDURE — 99999 PR PBB SHADOW E&M-EST. PATIENT-LVL III: CPT | Mod: PBBFAC,,, | Performed by: NURSE PRACTITIONER

## 2018-02-05 PROCEDURE — 99024 POSTOP FOLLOW-UP VISIT: CPT | Mod: S$GLB,,, | Performed by: NURSE PRACTITIONER

## 2018-02-05 NOTE — PROGRESS NOTES
Chief Complaint   Patient presents with    Post-op Evaluation       HPI   82 y.o. male presents for a post op visit. He has been doing well since his recent surgery. His pain is well controlled. MARU drain output has been <30ml in 24 hours.    Review of Systems   Constitutional: Negative for fatigue and unexpected weight change.   HENT: Per HPI.  Eyes: Negative for visual disturbance.   Respiratory: Negative for shortness of breath, hemoptysis   Cardiovascular: Negative for chest pain and palpitations.   Musculoskeletal: Negative for decreased ROM, back pain.   Skin: Negative for rash, sunburn, itching.   Neurological: Negative for dizziness and seizures.   Hematological: Negative for adenopathy. Does not bruise/bleed easily.   Endocrine: Negative for rapid weight loss/weight gain, heat/cold intolerance.     Past Medical History   Patient Active Problem List   Diagnosis    Preop exam for internal medicine    Anxiety state, unspecified    Depressive disorder, not elsewhere classified    Generalized anxiety disorder    Preventative health care    Depression    Lipoma of neck           Past Surgical History   Past Surgical History:   Procedure Laterality Date    APPENDECTOMY      COLONOSCOPY      PROSTATE SURGERY      TESTICLE BIOPSY      TESTICLE SURGERY           Family History   Family History   Problem Relation Age of Onset    Arthritis Mother     Alcohol abuse Father     Cancer Father     Depression Father     Early death Father     Heart disease Father     Hypertension Father     Alcohol abuse Sister     Depression Sister     Dementia Sister     Depression Brother     Alcohol abuse Brother            Social History   .  Social History     Social History    Marital status:      Spouse name: N/A    Number of children: 3    Years of education: N/A     Occupational History    Not on file.     Social History Main Topics    Smoking status: Never Smoker    Smokeless tobacco: Never  Used    Alcohol use 0.6 oz/week     1 Shots of liquor per week      Comment: every day    Drug use: No    Sexual activity: Not on file     Other Topics Concern    Not on file     Social History Narrative    No narrative on file         Allergies   Review of patient's allergies indicates:   Allergen Reactions    No known allergies            Physical Exam     Vitals:    02/05/18 0854   BP: (!) 152/76   Pulse: 77         Body mass index is 22.9 kg/m².      General: AOx3, NAD   Respiratory:  Symmetric chest rise, normal effort  Neck: Left neck Incision CDI.  No redness, drainage, or fluid collection noted.  Edges well approximated. MARU drain with small amount of SS fluid. Drain removed without difficulty.  Face: House Brackmann I bilaterally.     Assessment/Plan          Problem List Items Addressed This Visit     None        Aleksandra Jacques is healing well. MARU drain output is too high to remove at this visit today. Explained that output should be <30 ml in 24 hours. Questions answered. Dr. Hussein to call pt with final path report. RTC prn.

## 2018-03-26 PROBLEM — Z00.00 PREVENTATIVE HEALTH CARE: Status: RESOLVED | Noted: 2017-12-19 | Resolved: 2018-03-26

## 2018-06-04 ENCOUNTER — OFFICE VISIT (OUTPATIENT)
Dept: PSYCHIATRY | Facility: CLINIC | Age: 83
End: 2018-06-04
Payer: COMMERCIAL

## 2018-06-04 VITALS
HEART RATE: 72 BPM | DIASTOLIC BLOOD PRESSURE: 76 MMHG | BODY MASS INDEX: 22.69 KG/M2 | SYSTOLIC BLOOD PRESSURE: 161 MMHG | WEIGHT: 167.31 LBS

## 2018-06-04 DIAGNOSIS — F33.1 MDD (MAJOR DEPRESSIVE DISORDER), RECURRENT EPISODE, MODERATE: ICD-10-CM

## 2018-06-04 DIAGNOSIS — F41.1 GAD (GENERALIZED ANXIETY DISORDER): ICD-10-CM

## 2018-06-04 DIAGNOSIS — F33.0 MDD (MAJOR DEPRESSIVE DISORDER), RECURRENT EPISODE, MILD: Primary | ICD-10-CM

## 2018-06-04 PROCEDURE — 99214 OFFICE O/P EST MOD 30 MIN: CPT | Mod: S$GLB,,, | Performed by: PSYCHIATRY & NEUROLOGY

## 2018-06-04 PROCEDURE — 99999 PR PBB SHADOW E&M-EST. PATIENT-LVL II: CPT | Mod: PBBFAC,,, | Performed by: PSYCHIATRY & NEUROLOGY

## 2018-06-04 RX ORDER — ESCITALOPRAM OXALATE 20 MG/1
20 TABLET ORAL DAILY
Qty: 90 TABLET | Refills: 3 | Status: SHIPPED | OUTPATIENT
Start: 2018-06-04 | End: 2019-06-10 | Stop reason: SDUPTHER

## 2018-06-04 NOTE — PROGRESS NOTES
"Outpatient Psychiatry Follow-Up Visit (MD/NP)    6/4/2018    Clinical Status of Patient:  Outpatient (Ambulatory)    Session Length:  30 minutes (E&M)      Chief Complaint:  Aleksandra Jacques is a 83 y.o. male who presents today for follow-up of anxiety, panic attacks, depression.    Met with patient and spouse.      Interval History and Content of Current Session:  Interim Events/Subjective Report/Content of Current Session: First appointment with me since 11/30/2017.   Medication plan at last appt:  "Continue Remeron (mirtazapine) 15 mg 1/4 tablet at bedtime (will change dosing to 7.5 mg so pt can conveniently cut pills in 1/2; he can take more PRN).  He was encouraged to take a minimum of 1/2 tablet (3.75 mg) nightly.    Continue Lexapro 20 mg one tablet daily.  Pt may use Xanax 0.5 mg up to twice daily as needed for anxiety or insomnia, but pt told to avoid using unless necessary."    He had been taking just 1/4 tablet (3.75 mg) of the mirtazapine around 9 pm.  Lately, he states he has not needed it -- he quit taking the Remeron about 3 mos ago.    Now he only takes the Lexapro 20 mg in AM daily scheduled.    He also has NOT needed to take any Xanax for quite some time.    He denies any recent depressed mood.    He denies any recent physical complaints.      We also discussed recent echo stress test that he had done -- this test came out fine with only mild MR noted.      He is still doing home projects.  He even recently cleaned the connections to his [facility] because his phone and Internet were no longer working; this corrected the problem.      Current SIGECAPS:    Sleep -- good; he needs no meds to fall asleep.  He is able to fall asleep and sleeps until around 5 am.    Interests -- stays busy; he will do work around the house.  They have someone who does their his lawn.  He also does a fair amount of exercise daily.    Guilt -- denies excessive guilt    Energy -- "pretty good"; less tired overall " "  Concentration -- "all right"   Appetite -- "OK"   Psychomotor -- normal for age    Suicidal ideation -- denies hopelessness or SI.   He wants to live for his wife, children and grandchildren.  Their grandchildren range are in their teens and twenties.  Wife and pt like to take cruises.  They try to go at least once a year on a cruise.    He and his wife attend [employer] every weekday at 6:15 am.  They do not need to get up as early on the weekends, as they attend the [employer] service in the evening.    He realizes he has some ST memory loss.  Pt and wife do not see it as much of a problem at this time.   Finances are OK.  Bills are being paid on time.       Psychotherapy:  N/A      From previous session:  Also, when discussing most recent EKG, pt stated he had "skipped" heartbeat in 2014, which is why he had the EKG done in the ED.  His brother was suffering from heart disease and told pt about his Sx.  Pt stated he then realized he was having some of these Sx (occasional skipped heartbeat), so the went to ED.  He states the doctor in the ED told him his EKG was fine (he actually had 2 PVC's noted in 6 recorded beats on the EKG).  Pt DENIES having ANY recent Sx that would be concerning for any underlying heart disease.    His brother was also a lifelong smoker; pt has never smoked.     Review of Systems   · PSYCHIATRIC: Pertinant items are noted in the narrative.  · CONSTITUTIONAL:  No significant changes in wt.    · MUSCULOSKELETAL: No significant pain or stiffness of the joints.  · NEUROLOGIC: No weakness, sensory changes, seizures, confusion, memory loss, tremor or other abnormal movements.  · ENDOCRINE: No polydipsia or polyuria.  · INTEGUMENTARY: No rashes or lacerations.  · EYES: No exophthalmos, jaundice or blindness.  · ENT: No dizziness, tinnitus or hearing loss.  · RESPIRATORY: No shortness of breath or dyspnea on exertion.  · CARDIOVASCULAR: No tachycardia, chest pain, recent palpitations/"skipped" heart " beats.  · GASTROINTESTINAL: No nausea, vomiting, pain, constipation or diarrhea.  · GENITOURINARY: No frequency, dysuria.      Past Medical, Family and Social History: The patient's past medical, family and social history have been reviewed and updated as appropriate within the electronic medical record -- see encounter notes including my initial eval from 7/2/2014.    Current Meds:    Current Outpatient Prescriptions:     alprazolam (XANAX) 0.5 MG tablet, Take 1 tablet (0.5 mg total) by mouth 2 (two) times daily as needed for Insomnia or Anxiety., Disp: 60 tablet, Rfl: 3    aspirin (ECOTRIN) 81 MG EC tablet, Take 81 mg by mouth once daily., Disp: , Rfl:     cholecalciferol, vitamin D3, (VITAMIN D3) 1,000 unit capsule, Take 2,000 Units by mouth once daily., Disp: , Rfl:     escitalopram oxalate (LEXAPRO) 20 MG tablet, Take 1 tablet (20 mg total) by mouth once daily., Disp: 90 tablet, Rfl: 3    hydrocodone-acetaminophen 5-325mg (NORCO) 5-325 mg per tablet, Take 1 tablet by mouth every 4 (four) hours as needed., Disp: 15 tablet, Rfl: 0    metoprolol succinate (TOPROL-XL) 25 MG 24 hr tablet, Take 1 tablet (25 mg total) by mouth once daily., Disp: 30 tablet, Rfl: 11    mirtazapine (REMERON) 7.5 MG Tab, Take 1 tablet (7.5 mg total) by mouth every evening., Disp: 90 tablet, Rfl: 1  Pt is NOT taking Xanax or Remeron currently.    Compliance:  Yes.       Side effects: None    Risk Parameters:  Patient reports no suicidal ideation  Patient reports no homicidal ideation  Patient reports no self-injurious behavior  Patient reports no violent behavior    Exam (detailed: at least 9 elements; comprehensive: all 15 elements)   Constitutional  Vitals:  Most recent vital signs, dated less than 90 days prior to this appointment, were reviewed.     Vitals - 1 value per visit 2/2/2018 2/5/2018 6/4/2018   SYSTOLIC 180 152 161   DIASTOLIC 85 76 76   PULSE 81 77 72   TEMPERATURE 97.9     RESPIRATIONS      SPO2      Weight (lb)  "170.64 168.87 167.33   Weight (kg) 77.4 76.6 75.9   HEIGHT      BODY MASS INDEX 23.14 22.9 22.69   VISIT REPORT      Pain Score  0 0    *pt states he thinks he has "white coat hypertension"; his blood pressures have always been higher compared to outside the physicians' office     Vitals - 1 value per visit 3/2/2015 9/13/2017 11/30/2017   SYSTOLIC 156* 172* 167*   DIASTOLIC 76 87 79   PULSE 82 82 88   TEMPERATURE      RESPIRATIONS      SPO2      Weight (lb) 167 151 161.8   Weight (kg) 75.751 68.493 73.392   HEIGHT 6' 0" 6' 0" 6' 0"   BODY MASS INDEX 22.64 20.48 21.94   VISIT REPORT      Pain Score           Vitals - 1 value per visit 5/25/2014 7/2/2014 9/2/2014   SYSTOLIC 128 162 153   DIASTOLIC 80 75 71   PULSE 72 72 84   TEMPERATURE      RESPIRATIONS      SPO2 97     Weight (lb) 149.7 162 161   Weight (kg) 67.903 73.483 73.029   HEIGHT 6' 0" 6' 0" 6' 0"   BODY MASS INDEX 20.3 21.97 21.83   VISIT REPORT      Pain Score  0          General:  unremarkable, age appropriate, normal weight, well dressed, neatly groomed, friendly, cooperative, good eye contact, engaging     Musculoskeletal  Muscle Strength/Tone:  not examined   Gait & Station:  non-ataxic     Psychiatric  Speech:  no latency; no press, spontaneous, good articulation   Mood & Affect:  euthymic  congruent and appropriate   Thought Process:  goal-directed, logical   Associations:  intact   Thought Content:  normal, no suicidality, no homicidality, delusions, or paranoia   Insight:  intact   Judgement: behavior is adequate to circumstances   Orientation:  grossly intact   Memory: intact for content of interview   Language: grossly intact   Attention Span & Concentration:  able to focus   Fund of Knowledge:  intact and appropriate to age and level of education     EKG (9/13/17):    Test Reason : F33.1  Vent. Rate : 079 BPM     Atrial Rate : 079 BPM     P-R Int : 166 ms          QRS Dur : 102 ms      QT Int : 374 ms       P-R-T Axes : 068 075 063 degrees     " QTc Int : 428 ms  Sinus rhythm with frequent Premature ventricular complexes  Otherwise normal ECG  When compared with ECG of 18-MAY-2014 08:19,  No significant change was found  Confirmed by TITO WETZEL MD ([id number]) on 9/13/2017 4:53:01 PM    Assessment and Diagnosis   Status/Progress: Based on the examination today, the patient's problem(s) is/are improved.  New problems have not been presented today.   Co-morbidities are not complicating management of the primary condition.  There are no active rule-out diagnoses for this patient at this time.     Impression:   Major Depressive Disorder, recurrent, mild  Generalized Anxiety Disorder     Intervention/Counseling/Treatment Plan   Medication Management:  Continue Lexapro 20 mg one tablet daily.  Pt may use Xanax 0.5 mg up to twice daily as needed for anxiety or insomnia, but pt told to avoid using unless necessary.   Pt may use Remeron prn insomnia.    90 days supply Rx was written for Lexapro ONLY at pt's request.      Additional Note:  Pt needs an EKG based on report from 2014 and being on Lexapro.  EKG ordered -- will direct pt to have this test done today and will contact pt later with the results.    Pt also needs to see Dr. Armaan Weiss for routine medical checkup.  Has not seen PCP in over 3 years.     Return to Clinic: 1 year, or sooner prn.  I told pt if he prefers he could f/u with Dr. Armaan Weiss, his PCP, for the Lexapro.

## 2018-06-15 ENCOUNTER — PATIENT MESSAGE (OUTPATIENT)
Dept: PSYCHIATRY | Facility: CLINIC | Age: 83
End: 2018-06-15

## 2019-06-10 DIAGNOSIS — F33.1 MDD (MAJOR DEPRESSIVE DISORDER), RECURRENT EPISODE, MODERATE: ICD-10-CM

## 2019-06-10 RX ORDER — ESCITALOPRAM OXALATE 20 MG/1
TABLET ORAL
Qty: 30 TABLET | Refills: 11 | OUTPATIENT
Start: 2019-06-10

## 2019-06-10 RX ORDER — ESCITALOPRAM OXALATE 20 MG/1
TABLET ORAL
Qty: 90 TABLET | Refills: 0 | Status: SHIPPED | OUTPATIENT
Start: 2019-06-10 | End: 2019-09-13 | Stop reason: SDUPTHER

## 2019-09-12 DIAGNOSIS — F33.1 MDD (MAJOR DEPRESSIVE DISORDER), RECURRENT EPISODE, MODERATE: ICD-10-CM

## 2019-09-12 RX ORDER — ESCITALOPRAM OXALATE 20 MG/1
TABLET ORAL
Qty: 90 TABLET | Refills: 0 | OUTPATIENT
Start: 2019-09-12

## 2019-09-13 DIAGNOSIS — F33.1 MDD (MAJOR DEPRESSIVE DISORDER), RECURRENT EPISODE, MODERATE: ICD-10-CM

## 2019-09-13 RX ORDER — ESCITALOPRAM OXALATE 20 MG/1
TABLET ORAL
Qty: 30 TABLET | Refills: 4 | Status: SHIPPED | OUTPATIENT
Start: 2019-09-13 | End: 2020-02-21 | Stop reason: SDUPTHER

## 2019-09-13 RX ORDER — ESCITALOPRAM OXALATE 20 MG/1
20 TABLET ORAL DAILY
Qty: 90 TABLET | Refills: 0 | OUTPATIENT
Start: 2019-09-13

## 2019-09-23 ENCOUNTER — OFFICE VISIT (OUTPATIENT)
Dept: URGENT CARE | Facility: CLINIC | Age: 84
End: 2019-09-23
Payer: COMMERCIAL

## 2019-09-23 VITALS
DIASTOLIC BLOOD PRESSURE: 80 MMHG | OXYGEN SATURATION: 98 % | BODY MASS INDEX: 22.62 KG/M2 | RESPIRATION RATE: 10 BRPM | TEMPERATURE: 98 F | WEIGHT: 167 LBS | SYSTOLIC BLOOD PRESSURE: 135 MMHG | HEIGHT: 72 IN | HEART RATE: 79 BPM

## 2019-09-23 DIAGNOSIS — L98.9 SKIN LESION OF RIGHT LEG: Primary | ICD-10-CM

## 2019-09-23 PROCEDURE — 99214 PR OFFICE/OUTPT VISIT, EST, LEVL IV, 30-39 MIN: ICD-10-PCS | Mod: S$GLB,,, | Performed by: NURSE PRACTITIONER

## 2019-09-23 PROCEDURE — 99214 OFFICE O/P EST MOD 30 MIN: CPT | Mod: S$GLB,,, | Performed by: NURSE PRACTITIONER

## 2019-09-23 RX ORDER — MUPIROCIN 20 MG/G
OINTMENT TOPICAL
Qty: 22 G | Refills: 0 | Status: SHIPPED | OUTPATIENT
Start: 2019-09-23 | End: 2019-09-30

## 2019-09-23 NOTE — PROGRESS NOTES
Subjective:       Patient ID: Aleksandra Jacques is a 84 y.o. male.    Vitals:  height is 6' (1.829 m) and weight is 75.8 kg (167 lb). His oral temperature is 98.3 °F (36.8 °C). His blood pressure is 135/80 and his pulse is 79. His respiration is 10 and oxygen saturation is 98%.     Chief Complaint: Skin Problem (right lower leg)    Patient presents to clinic today with complaints of a right lower leg lesion.  Patient reports that the lesion has been present over last month.  Reports the lesion has been growing in size.  Patient presents to clinic today due to concerns of minimal surrounding redness.  Denies any drainage from the site.  Patient reports that he attempted to make a plan with Dermatology but was unable to into the end of October.  Patient denies any fever chills.  Denies any pain to the site.  Patient initially claims that he was treatment with compound W due to concerns of possible wart, denies any improvement after treatment.    Wound Check   He was originally treated more than 14 days ago. His temperature was unmeasured prior to arrival. There has been no drainage from the wound. There is no redness present. There is no swelling present. There is no pain present.       Constitution: Negative for chills, fatigue and fever.   HENT: Negative for congestion and sore throat.    Neck: Negative for painful lymph nodes.   Cardiovascular: Negative for chest pain and leg swelling.   Eyes: Negative for double vision and blurred vision.   Respiratory: Negative for cough and shortness of breath.    Gastrointestinal: Negative for nausea, vomiting and diarrhea.   Genitourinary: Negative for dysuria, frequency and urgency.   Musculoskeletal: Negative for joint pain, joint swelling, muscle cramps and muscle ache.   Skin: Positive for wound (right lower leg ). Negative for color change, pale, rash and erythema.   Allergic/Immunologic: Negative for seasonal allergies.   Neurological: Negative for dizziness, history of  vertigo, light-headedness, passing out and headaches.   Hematologic/Lymphatic: Negative for swollen lymph nodes, easy bruising/bleeding and history of blood clots. Does not bruise/bleed easily.   Psychiatric/Behavioral: Negative for nervous/anxious, sleep disturbance and depression. The patient is not nervous/anxious.        Objective:      Physical Exam   Constitutional: He is oriented to person, place, and time. He appears well-developed and well-nourished.   HENT:   Head: Normocephalic and atraumatic. Head is without abrasion, without contusion and without laceration.   Right Ear: External ear normal.   Left Ear: External ear normal.   Nose: Nose normal.   Mouth/Throat: Oropharynx is clear and moist.   Eyes: Pupils are equal, round, and reactive to light. Conjunctivae, EOM and lids are normal.   Neck: Trachea normal, full passive range of motion without pain and phonation normal. Neck supple.   Cardiovascular: Normal rate, regular rhythm and normal heart sounds.   Pulmonary/Chest: Effort normal and breath sounds normal. No stridor. No respiratory distress.   Musculoskeletal: Normal range of motion.   Neurological: He is alert and oriented to person, place, and time.   Skin: Skin is warm, dry and intact. Capillary refill takes less than 2 seconds. Lesion (Large irregular lesion present to the right lower leg.  There is minimal surrounding erythema.  There is a large area of granulated tissue present.) noted. No abrasion, no bruising, no burn, no ecchymosis, no laceration and no rash noted. No erythema.   Psychiatric: He has a normal mood and affect. His speech is normal and behavior is normal. Judgment and thought content normal. Cognition and memory are normal.   Nursing note and vitals reviewed.      Assessment:       1. Skin lesion of right leg        Plan:         Dr. Lozano evaluated the patient and the lesion.  Concerning irregularly lesion present to the right lower leg.  The lesion has irregular borders.   Small amount of serosanguineous/sanguinous discharge present on exam.  The area is mildly tender during exam.  Concerns for possible benign or cancerous lesion.  Patient is given Bactroban to apply to the lesion as well as an urgent Dermatology referral.  Dr. Lozano advises the treatment plan and is in agreement.     Skin lesion of right leg  -     mupirocin (BACTROBAN) 2 % ointment; Apply to affected area 3 times daily  Dispense: 22 g; Refill: 0  -     Ambulatory referral to Dermatology      Patient Instructions     Tumor, Uncertain Cause  The body is constantly growing new cells to replace cells that wear out or are damaged in some way. A tumor occurs when cells of the body begin to grow abnormally and make more cells than needed.  A benign tumor is a growth that is not cancer. These remain in one place and tend to grow slowly. Benign tumors dont spread to other areas of the body and are rarely life-threatening. (Benign brain tumors are an exception. They can press on parts of the brain and cause serious problems.)  Types of benign tumors found in different parts of the body:  · Breast. Cyst (fluid-filled sac) or adenoma (overgrowth of glandular tissue)  · Skin. Lipoma (overgrowth of fat cells) or sebaceous cyst (sac filled with skin oils and dead skin cells)  · Thyroid. Colloid nodule (an overgrowth of normal thyroid tissue)  · Colon. Polyps (out-pouching of the moist lining of the intestine)  · Lung. Granuloma (calcium deposits in an area of scarring)  A malignant tumor is cancer. It will invade nearby tissues and can spread through the blood and lymph to other parts of the body.  Types of cancer:  · Carcinoma. Grows in the lining of organs, like the skin, lungs, breast, colon and intestines.  · Sarcoma. Grows in bones and muscle tissues.  · Lymphoma. Grows in the lymph nodes and affects a type of white blood cell (lymphocytes).  · Leukemia. Grows in the bone marrow and affects white blood cells.  · Myeloma.  Grows in the bone marrow and affects cells of the immune system.  Treatment:  · Benign tumors can often be removed with surgery and usually do not grow back.  · Cancers are treated by surgery, chemotherapy, radiation, or a combination of these methods.  Many tumors grow deep in the body and are only found after they cause symptoms or problems. See a healthcare provider right away if you notice any of these changes:  · A new lump or bump that's getting bigger  · Blood in your stool  · Unexpected weight loss (more than 10 pounds in less than 3 months)  · Fever or frequent colds or infections  · A skin sore that doesnt heal  · New lump in the breast or changes in the nipple or skin of your breast  · Indigestion or trouble swallowing  · A change in the look of a wart or mole  · Persistent cough, hoarseness, or bloody sputum  · Extreme tiredness that doesn't get better with sleep  · Unexplained pain that doesn't get better or respond to pain medicine  · White patches or sores in your mouth or on your tongue  · Changes in your ability to eat, urinate, or have a bowel movement  Most problems are not caused by cancer, but they may still need to be treated. Only a healthcare provider can tell you if a change is cancer.   Date Last Reviewed: 4/1/2017 © 2000-2017 [employer]. [address] [place] [address], [place] [address]. All rights reserved. This information is not intended as a substitute for professional medical care. Always follow your healthcare professional's instructions.      -Bactroban to the affected skin lesion twice daily for the next 5-7 days.  -Keep the site clean and covered.  -Follow up with Dermatology.  Please follow up with your Primary care provider within 2-5 days if your signs and symptoms have not resolved or worsen.     If your condition worsens or fails to improve we recommend that you receive another evaluation at the emergency room immediately or contact your primary medical clinic to  discuss your concerns.   You must understand that you have received an Urgent Care treatment only and that you may be released before all of your medical problems are known or treated. You, the patient, will arrange for follow up care as instructed.

## 2019-09-23 NOTE — PATIENT INSTRUCTIONS
Tumor, Uncertain Cause  The body is constantly growing new cells to replace cells that wear out or are damaged in some way. A tumor occurs when cells of the body begin to grow abnormally and make more cells than needed.  A benign tumor is a growth that is not cancer. These remain in one place and tend to grow slowly. Benign tumors dont spread to other areas of the body and are rarely life-threatening. (Benign brain tumors are an exception. They can press on parts of the brain and cause serious problems.)  Types of benign tumors found in different parts of the body:  · Breast. Cyst (fluid-filled sac) or adenoma (overgrowth of glandular tissue)  · Skin. Lipoma (overgrowth of fat cells) or sebaceous cyst (sac filled with skin oils and dead skin cells)  · Thyroid. Colloid nodule (an overgrowth of normal thyroid tissue)  · Colon. Polyps (out-pouching of the moist lining of the intestine)  · Lung. Granuloma (calcium deposits in an area of scarring)  A malignant tumor is cancer. It will invade nearby tissues and can spread through the blood and lymph to other parts of the body.  Types of cancer:  · Carcinoma. Grows in the lining of organs, like the skin, lungs, breast, colon and intestines.  · Sarcoma. Grows in bones and muscle tissues.  · Lymphoma. Grows in the lymph nodes and affects a type of white blood cell (lymphocytes).  · Leukemia. Grows in the bone marrow and affects white blood cells.  · Myeloma. Grows in the bone marrow and affects cells of the immune system.  Treatment:  · Benign tumors can often be removed with surgery and usually do not grow back.  · Cancers are treated by surgery, chemotherapy, radiation, or a combination of these methods.  Many tumors grow deep in the body and are only found after they cause symptoms or problems. See a healthcare provider right away if you notice any of these changes:  · A new lump or bump that's getting bigger  · Blood in your stool  · Unexpected weight loss (more than  10 pounds in less than 3 months)  · Fever or frequent colds or infections  · A skin sore that doesnt heal  · New lump in the breast or changes in the nipple or skin of your breast  · Indigestion or trouble swallowing  · A change in the look of a wart or mole  · Persistent cough, hoarseness, or bloody sputum  · Extreme tiredness that doesn't get better with sleep  · Unexplained pain that doesn't get better or respond to pain medicine  · White patches or sores in your mouth or on your tongue  · Changes in your ability to eat, urinate, or have a bowel movement  Most problems are not caused by cancer, but they may still need to be treated. Only a healthcare provider can tell you if a change is cancer.   Date Last Reviewed: 4/1/2017  © 4895-6806 [employer]. [address] [place] [address]. All rights reserved. This information is not intended as a substitute for professional medical care. Always follow your healthcare professional's instructions.      -Bactroban to the affected skin lesion twice daily for the next 5-7 days.  -Keep the site clean and covered.  -Follow up with Dermatology.  Please follow up with your Primary care provider within 2-5 days if your signs and symptoms have not resolved or worsen.     If your condition worsens or fails to improve we recommend that you receive another evaluation at the emergency room immediately or contact your primary medical clinic to discuss your concerns.   You must understand that you have received an Urgent Care treatment only and that you may be released before all of your medical problems are known or treated. You, the patient, will arrange for follow up care as instructed.

## 2019-10-02 ENCOUNTER — OFFICE VISIT (OUTPATIENT)
Dept: DERMATOLOGY | Facility: CLINIC | Age: 84
End: 2019-10-02
Payer: COMMERCIAL

## 2019-10-02 DIAGNOSIS — D48.5 NEOPLASM OF UNCERTAIN BEHAVIOR OF SKIN: Primary | ICD-10-CM

## 2019-10-02 DIAGNOSIS — Z12.83 SCREENING EXAM FOR SKIN CANCER: ICD-10-CM

## 2019-10-02 DIAGNOSIS — L57.0 ACTINIC KERATOSIS: ICD-10-CM

## 2019-10-02 DIAGNOSIS — L73.8 SEBACEOUS GLAND HYPERPLASIA: ICD-10-CM

## 2019-10-02 DIAGNOSIS — L82.1 SEBORRHEIC KERATOSES: ICD-10-CM

## 2019-10-02 PROCEDURE — 1101F PT FALLS ASSESS-DOCD LE1/YR: CPT | Mod: CPTII,S$GLB,, | Performed by: DERMATOLOGY

## 2019-10-02 PROCEDURE — 17003 DESTRUCT PREMALG LES 2-14: CPT | Mod: 59,S$GLB,, | Performed by: DERMATOLOGY

## 2019-10-02 PROCEDURE — 17003 DESTRUCTION, PREMALIGNANT LESIONS; SECOND THROUGH 14 LESIONS: ICD-10-PCS | Mod: 59,S$GLB,, | Performed by: DERMATOLOGY

## 2019-10-02 PROCEDURE — 99999 PR PBB SHADOW E&M-EST. PATIENT-LVL II: CPT | Mod: PBBFAC,,, | Performed by: DERMATOLOGY

## 2019-10-02 PROCEDURE — 11102 TANGNTL BX SKIN SINGLE LES: CPT | Mod: S$GLB,,, | Performed by: DERMATOLOGY

## 2019-10-02 PROCEDURE — 88305 TISSUE EXAM BY PATHOLOGIST: CPT | Performed by: PATHOLOGY

## 2019-10-02 PROCEDURE — 17000 DESTRUCT PREMALG LESION: CPT | Mod: 59,S$GLB,, | Performed by: DERMATOLOGY

## 2019-10-02 PROCEDURE — 99203 PR OFFICE/OUTPT VISIT, NEW, LEVL III, 30-44 MIN: ICD-10-PCS | Mod: 25,S$GLB,, | Performed by: DERMATOLOGY

## 2019-10-02 PROCEDURE — 99203 OFFICE O/P NEW LOW 30 MIN: CPT | Mod: 25,S$GLB,, | Performed by: DERMATOLOGY

## 2019-10-02 PROCEDURE — 1101F PR PT FALLS ASSESS DOC 0-1 FALLS W/OUT INJ PAST YR: ICD-10-PCS | Mod: CPTII,S$GLB,, | Performed by: DERMATOLOGY

## 2019-10-02 PROCEDURE — 11102 PR TANGENTIAL BIOPSY, SKIN, SINGLE LESION: ICD-10-PCS | Mod: S$GLB,,, | Performed by: DERMATOLOGY

## 2019-10-02 PROCEDURE — 88305 TISSUE SPECIMEN TO PATHOLOGY, DERMATOLOGY: ICD-10-PCS | Mod: 26,,, | Performed by: PATHOLOGY

## 2019-10-02 PROCEDURE — 17000 PR DESTRUCTION(LASER SURGERY,CRYOSURGERY,CHEMOSURGERY),PREMALIGNANT LESIONS,FIRST LESION: ICD-10-PCS | Mod: 59,S$GLB,, | Performed by: DERMATOLOGY

## 2019-10-02 PROCEDURE — 88305 TISSUE EXAM BY PATHOLOGIST: CPT | Mod: 26,,, | Performed by: PATHOLOGY

## 2019-10-02 PROCEDURE — 99999 PR PBB SHADOW E&M-EST. PATIENT-LVL II: ICD-10-PCS | Mod: PBBFAC,,, | Performed by: DERMATOLOGY

## 2019-10-02 NOTE — PROGRESS NOTES
Subjective:       Patient ID:  Aleksandra Jacques is a 84 y.o. male who presents for   Chief Complaint   Patient presents with    Lesion     right leg     Patient Present for lesion on right lower leg. Lesion has been present for 2 months with no pain.  Patient went to urgent care on 9/23/2019 patient was prescribed cream which is not helping. This is a high risk patient here to check for the development of new lesions. Past h/o NMSC, most recent 10+ years ago. Pt last saw dermatologist 5 years ago. He complains of scaling brown spots on the back. No treatment.    Lesion  - Initial  Affected locations: right lower leg  Duration: 2 months  Signs / symptoms: bleeding, blistering, bruising, draining, oozing, redness, scaling, peeling and tender  Severity: moderate  Aggravated by: nothing  Relieving factors/Treatments tried: OTC antibiotic cream  Improvement on treatment: moderate        Review of Systems   Constitutional: Negative for fever, chills, weight loss, weight gain, fatigue, night sweats and malaise.   Skin: Positive for wears hat. Negative for daily sunscreen use, activity-related sunscreen use and recent sunburn.   Hematologic/Lymphatic: Bruises/bleeds easily.        Objective:    Physical Exam   Constitutional: He appears well-developed and well-nourished. No distress.   Neurological: He is alert and oriented to person, place, and time. He is not disoriented.   Psychiatric: He has a normal mood and affect.   Skin:   Areas Examined (abnormalities noted in diagram):   Scalp / Hair Palpated and Inspected  Head / Face Inspection Performed  Neck Inspection Performed  Chest / Axilla Inspection Performed  Abdomen Inspection Performed  Genitals / Buttocks / Groin Inspection Performed  Back Inspection Performed  RUE Inspected  LUE Inspection Performed  RLE Inspected  LLE Inspection Performed  Nails and Digits Inspection Performed                   Diagram Legend     Erythematous scaling macule/papule c/w actinic  keratosis       Vascular papule c/w angioma      Pigmented verrucoid papule/plaque c/w seborrheic keratosis      Yellow umbilicated papule c/w sebaceous hyperplasia      Irregularly shaped tan macule c/w lentigo     1-2 mm smooth white papules consistent with Milia      Movable subcutaneous cyst with punctum c/w epidermal inclusion cyst      Subcutaneous movable cyst c/w pilar cyst      Firm pink to brown papule c/w dermatofibroma      Pedunculated fleshy papule(s) c/w skin tag(s)      Evenly pigmented macule c/w junctional nevus     Mildly variegated pigmented, slightly irregular-bordered macule c/w mildly atypical nevus      Flesh colored to evenly pigmented papule c/w intradermal nevus       Pink pearly papule/plaque c/w basal cell carcinoma      Erythematous hyperkeratotic cursted plaque c/w SCC      Surgical scar with no sign of skin cancer recurrence      Open and closed comedones      Inflammatory papules and pustules      Verrucoid papule consistent consistent with wart     Erythematous eczematous patches and plaques     Dystrophic onycholytic nail with subungual debris c/w onychomycosis     Umbilicated papule    Erythematous-base heme-crusted tan verrucoid plaque consistent with inflamed seborrheic keratosis     Erythematous Silvery Scaling Plaque c/w Psoriasis     See annotation          Assessment / Plan:      Pathology Orders:     Normal Orders This Visit    Tissue Specimen To Pathology, Dermatology     Questions:    Directional Terms:  Other(comment)    Clinical Information:  2 cm hyperkeratotic plaque r/o KA type SCC    Specific Site:  right leg        Neoplasm of uncertain behavior of skin  -     Tissue Specimen To Pathology, Dermatology  R/o KA  Shave biopsy procedure note:    Shave biopsy performed after verbal consent including risk of infection, scar, recurrence, need for additional treatment of site. Area prepped with alcohol, anesthetized with approximately 1.0cc of 1% lidocaine with  epinephrine. Lesional tissue shaved with razor blade. Hemostasis achieved with application of aluminum chloride followed by hyfrecation. No complications. Dressing applied. Wound care explained.    If biopsy positive for malignancy, refer to Dr. Meneses for Mohs surgery consultation.    Screening exam for skin cancer    Total body skin examination performed today including at least 12 points as noted in physical examination. Suspicious lesions noted.    Seborrheic keratoses  These are benign inherited growths without a malignant potential. Reassurance given to patient. No treatment is necessary.     Actinic keratosis  Cryosurgery Procedure Note    Verbal consent from the patient is obtained including, but not limited to, risk of hypopigmentation/hyperpigmentation, scar, recurrence of lesion. The patient is aware of the precancerous quality and need for treatment of these lesions. Liquid nitrogen cryosurgery is applied to the 7 actinic keratoses, as detailed in the physical exam, to produce a freeze injury. The patient is aware that blisters may form and is instructed on wound care with gentle cleansing and use of vaseline ointment to keep moist until healed. The patient is supplied a handout on cryosurgery and is instructed to call if lesions do not completely resolve.    Sebaceous gland hyperplasia  This is a common condition representing benign enlargement of the sebaceous lobule. It typically occurs in adulthood. Reassurance given to patient.              Follow up in about 1 year (around 10/2/2020) for for TBSE.

## 2019-10-08 ENCOUNTER — PATIENT MESSAGE (OUTPATIENT)
Dept: DERMATOLOGY | Facility: CLINIC | Age: 84
End: 2019-10-08

## 2019-10-09 ENCOUNTER — TELEPHONE (OUTPATIENT)
Dept: DERMATOLOGY | Facility: CLINIC | Age: 84
End: 2019-10-09

## 2019-10-09 NOTE — TELEPHONE ENCOUNTER
----- Message from Naty Briscoe LPN sent at 10/9/2019  3:30 PM CDT -----  Contact: Patient      ----- Message -----  From: Phuong Guzman  Sent: 10/9/2019  12:58 PM CDT  To: Ana Morrison Staff    Test Results    Who Called: Patient     Name of Test (Lab/Mammo/Etc): Biospy    Date of Test: 10/02    Best Call Back Number: [phone]

## 2019-10-09 NOTE — TELEPHONE ENCOUNTER
Pt was scheduled for Mohs sx same day on 10/23/19 at 12:30. Explained Mohs sx to pt and pt verbally confirmed understanding of information given to him.

## 2019-10-23 ENCOUNTER — PROCEDURE VISIT (OUTPATIENT)
Dept: DERMATOLOGY | Facility: CLINIC | Age: 84
End: 2019-10-23
Payer: COMMERCIAL

## 2019-10-23 VITALS
BODY MASS INDEX: 22.62 KG/M2 | HEART RATE: 85 BPM | HEIGHT: 72 IN | WEIGHT: 167 LBS | DIASTOLIC BLOOD PRESSURE: 85 MMHG | SYSTOLIC BLOOD PRESSURE: 162 MMHG

## 2019-10-23 DIAGNOSIS — C44.722 SQUAMOUS CELL CARCINOMA OF RIGHT LOWER LEG: Primary | ICD-10-CM

## 2019-10-23 PROCEDURE — 99499 NO LOS: ICD-10-PCS | Mod: S$GLB,,, | Performed by: DERMATOLOGY

## 2019-10-23 PROCEDURE — 17313 MOHS 1 STAGE T/A/L: CPT | Mod: S$GLB,,, | Performed by: DERMATOLOGY

## 2019-10-23 PROCEDURE — 13121 PR RECMPL WND SCALP,EXTR 2.6-7.5 CM: ICD-10-PCS | Mod: 51,S$GLB,, | Performed by: DERMATOLOGY

## 2019-10-23 PROCEDURE — 13121 CMPLX RPR S/A/L 2.6-7.5 CM: CPT | Mod: 51,S$GLB,, | Performed by: DERMATOLOGY

## 2019-10-23 PROCEDURE — 99499 UNLISTED E&M SERVICE: CPT | Mod: S$GLB,,, | Performed by: DERMATOLOGY

## 2019-10-23 PROCEDURE — 17313: ICD-10-PCS | Mod: S$GLB,,, | Performed by: DERMATOLOGY

## 2019-10-23 RX ORDER — HYDROCODONE BITARTRATE AND ACETAMINOPHEN 5; 325 MG/1; MG/1
1 TABLET ORAL EVERY 6 HOURS PRN
Qty: 10 TABLET | Refills: 0 | Status: SHIPPED | OUTPATIENT
Start: 2019-10-23 | End: 2020-05-28

## 2019-10-23 RX ORDER — CEPHALEXIN 500 MG/1
500 CAPSULE ORAL 3 TIMES DAILY
Qty: 30 CAPSULE | Refills: 0 | Status: SHIPPED | OUTPATIENT
Start: 2019-10-23 | End: 2019-11-02

## 2019-10-23 NOTE — PROGRESS NOTES
PROCEDURE: Mohs' Micrographic Surgery    INDICATION: Tumors with aggressive clinical behavior (rapidly growing, greater than 1 cm in diameter). Tumor with ill-defined borders. Tumor size >2 cm on the trunk or extremities.    REFERRING MD: Tamiko Perez MD    CASE NUMBER:     ANESTHETIC: 9 cc 0.5% Lidocaine with Epi 1:200,000 mixed 1:1 with 0.5% Bupivacaine    SURGICAL PREP: Hibiclens    SURGEON: Selma Meneses MD    ASSISTANTS: Ember Lozano PA-C    PREOPERATIVE DIAGNOSIS: squamous cell carcinoma    POSTOPERATIVE DIAGNOSIS: squamous cell carcinoma    PATHOLOGIC DIAGNOSIS: squamous cell carcinoma- invasive, well differentiated, moderately differentiated, infiltrating    HISTOLOGY OF SPECIMENS IN FIRST STAGE:   Tumor Type: No tumor seen.    STAGES OF MOHS' SURGERY PERFORMED: 1    TUMOR-FREE PLANE ACHIEVED: Yes    HEMOSTASIS: electrocoagulation     SPECIMENS: 5    LOCATION: right leg (superior lower leg/below knee). Patient verified location.    INITIAL LESION SIZE: 2.5 x 2.5 cm    FINAL DEFECT SIZE: 2.6 x 2.7 cm    WOUND REPAIR/DISPOSITION: The patient tolerated Mohs' Micrographic Surgery for a squamous cell carcinoma very well. When the tumor was completely removed, a repair of the surgical defect was undertaken.      PROCEDURE: Complex Linear Repair    INDICATION: Status post Mohs' Micrographic Surgery for squamous cell carcinoma.    CASE NUMBER:     SURGEON: Selma Meneses MD    ASSISTANTS: Ember Lozano PA-C and Zara Gage Surg Jus    ANESTHETIC: 3 cc 1% Lidocaine with Epinephrine 1:100,000    SURGICAL PREP: Hibiclens, prepped by Zara Gage Surg Tech    LOCATION: right leg (superior lower leg/below knee)    DEFECT SIZE: 2.6 x 2.7 cm    WOUND REPAIR/DISPOSITION:  After the patient's carcinoma had been completely removed with Mohs' Micrographic Surgery, a repair of the surgical defect was undertaken. The patient was returned to the operating suite where the area of right superior lower leg/below  knee was prepped, draped, and anesthetized in the usual sterile fashion. The wound was widely undermined in all directions. Then, electrocoagulation was used to obtain meticulous hemostasis. 3-0 Vicryl buried vertical mattress sutures were placed into the subcutaneous and dermal plane to close the wound and devon the cutaneous wound edge. Bilateral dog ears were identified and were removed by a standard Burow's triangle technique. The cutaneous wound edges were closed using interrupted 3-0 Prolene suture.    The patient tolerated the procedure well.    The area was cleaned and dressed appropriately and the patient was given wound care instructions, as well as appointment for follow-up evaluation. Patient was placed on Norco 5-325 mg prn postop pain and Keflex 500 mg TID x 10 days.    LENGTH OF REPAIR: 6.3 cm    Vitals:    10/23/19 1159 10/23/19 1430   BP: (!) 175/79 (!) 162/85   BP Location: Left arm    Patient Position: Sitting    BP Method: Large (Automatic)    Pulse:  85   Weight: 75.8 kg (167 lb)    Height: 6' (1.829 m)

## 2019-11-18 ENCOUNTER — OFFICE VISIT (OUTPATIENT)
Dept: DERMATOLOGY | Facility: CLINIC | Age: 84
End: 2019-11-18
Payer: COMMERCIAL

## 2019-11-18 DIAGNOSIS — Z09 POSTOP CHECK: Primary | ICD-10-CM

## 2019-11-18 PROCEDURE — 99024 PR POST-OP FOLLOW-UP VISIT: ICD-10-PCS | Mod: S$GLB,,, | Performed by: DERMATOLOGY

## 2019-11-18 PROCEDURE — 99024 POSTOP FOLLOW-UP VISIT: CPT | Mod: S$GLB,,, | Performed by: DERMATOLOGY

## 2019-11-18 NOTE — PROGRESS NOTES
84 y.o. male patient is here for suture removal following Mohs' surgery.    Patient reports no problems with r leg     WOUND PE:  The r leg sutures intact. Wound healing well. Good skin edges. No signs or symptoms of infection.      IMPRESSION:  Healing operative site from Mohs' surgery r leg, SCC s/p Mohs with CLC postop day # 26.     PLAN:  Sutures removed today. Steri-strips applied.  Discontinue wound care.  Keep moist with Aquaphor.    RTC:  In 1 month

## 2019-12-18 ENCOUNTER — OFFICE VISIT (OUTPATIENT)
Dept: DERMATOLOGY | Facility: CLINIC | Age: 84
End: 2019-12-18
Payer: COMMERCIAL

## 2019-12-18 DIAGNOSIS — C44.722 SQUAMOUS CELL CARCINOMA OF SKIN OF RIGHT LOWER EXTREMITY: Primary | ICD-10-CM

## 2019-12-18 PROCEDURE — 99999 PR PBB SHADOW E&M-EST. PATIENT-LVL II: ICD-10-PCS | Mod: PBBFAC,,, | Performed by: DERMATOLOGY

## 2019-12-18 PROCEDURE — 99999 PR PBB SHADOW E&M-EST. PATIENT-LVL II: CPT | Mod: PBBFAC,,, | Performed by: DERMATOLOGY

## 2019-12-18 PROCEDURE — 1101F PT FALLS ASSESS-DOCD LE1/YR: CPT | Mod: CPTII,S$GLB,, | Performed by: DERMATOLOGY

## 2019-12-18 PROCEDURE — 1126F AMNT PAIN NOTED NONE PRSNT: CPT | Mod: S$GLB,,, | Performed by: DERMATOLOGY

## 2019-12-18 PROCEDURE — 1126F PR PAIN SEVERITY QUANTIFIED, NO PAIN PRESENT: ICD-10-PCS | Mod: S$GLB,,, | Performed by: DERMATOLOGY

## 2019-12-18 PROCEDURE — 1101F PR PT FALLS ASSESS DOC 0-1 FALLS W/OUT INJ PAST YR: ICD-10-PCS | Mod: CPTII,S$GLB,, | Performed by: DERMATOLOGY

## 2019-12-18 PROCEDURE — 1159F PR MEDICATION LIST DOCUMENTED IN MEDICAL RECORD: ICD-10-PCS | Mod: S$GLB,,, | Performed by: DERMATOLOGY

## 2019-12-18 PROCEDURE — 99212 PR OFFICE/OUTPT VISIT, EST, LEVL II, 10-19 MIN: ICD-10-PCS | Mod: S$GLB,,, | Performed by: DERMATOLOGY

## 2019-12-18 PROCEDURE — 1159F MED LIST DOCD IN RCRD: CPT | Mod: S$GLB,,, | Performed by: DERMATOLOGY

## 2019-12-18 PROCEDURE — 99212 OFFICE O/P EST SF 10 MIN: CPT | Mod: S$GLB,,, | Performed by: DERMATOLOGY

## 2019-12-18 NOTE — PROGRESS NOTES
84 y.o. male patient is here for wound check after surgery.    Patient reports no problems r lower leg.     WOUND PE:  The r lower leg  incision is well healed except in center with small dehiscence with good granulation tissue. No signs of infection.      IMPRESSION:  Healing operative site from Mohs' surgery SCC r lower leg s/p Mohs with CLC post op week # 7 now with small dehiscence.    PLAN:  Cont wound care with aquaphor and cover daily.   Keep moist with Aquaphor   Reassured pt it will heal but will take another 1-2 months for it to re-epithelialize.  Wound rebandaged today.    RTC:  In 1 month

## 2020-01-22 ENCOUNTER — OFFICE VISIT (OUTPATIENT)
Dept: DERMATOLOGY | Facility: CLINIC | Age: 85
End: 2020-01-22
Payer: COMMERCIAL

## 2020-01-22 DIAGNOSIS — C44.722 SQUAMOUS CELL CARCINOMA OF SKIN OF RIGHT LOWER EXTREMITY: Primary | ICD-10-CM

## 2020-01-22 PROCEDURE — 1101F PR PT FALLS ASSESS DOC 0-1 FALLS W/OUT INJ PAST YR: ICD-10-PCS | Mod: CPTII,S$GLB,, | Performed by: DERMATOLOGY

## 2020-01-22 PROCEDURE — 1159F MED LIST DOCD IN RCRD: CPT | Mod: S$GLB,,, | Performed by: DERMATOLOGY

## 2020-01-22 PROCEDURE — 99999 PR PBB SHADOW E&M-EST. PATIENT-LVL I: ICD-10-PCS | Mod: PBBFAC,,, | Performed by: DERMATOLOGY

## 2020-01-22 PROCEDURE — 1101F PT FALLS ASSESS-DOCD LE1/YR: CPT | Mod: CPTII,S$GLB,, | Performed by: DERMATOLOGY

## 2020-01-22 PROCEDURE — 1159F PR MEDICATION LIST DOCUMENTED IN MEDICAL RECORD: ICD-10-PCS | Mod: S$GLB,,, | Performed by: DERMATOLOGY

## 2020-01-22 PROCEDURE — 99999 PR PBB SHADOW E&M-EST. PATIENT-LVL I: CPT | Mod: PBBFAC,,, | Performed by: DERMATOLOGY

## 2020-01-22 PROCEDURE — 99212 OFFICE O/P EST SF 10 MIN: CPT | Mod: S$GLB,,, | Performed by: DERMATOLOGY

## 2020-01-22 PROCEDURE — 99212 PR OFFICE/OUTPT VISIT, EST, LEVL II, 10-19 MIN: ICD-10-PCS | Mod: S$GLB,,, | Performed by: DERMATOLOGY

## 2020-01-22 NOTE — PROGRESS NOTES
84 y.o. male patient is here for wound check after surgery.    Patient reports no problems with r leg     WOUND PE:  The r leg  incision is well healed. Mild firmness and erythema of scar. No nodularity.      IMPRESSION:  Healing operative site from Mohs' surgery SCC r leg s/p Mohs with CLC postop week # 12, all heled.    PLAN:  Discontinue wound care. Reassured patient that redness and firmness will fade with time.  Daily SPF.  Regular skin checks.    RTC:  In 3-6 months with Tamiko Perez MD for skin check or sooner if new concern arises.

## 2020-02-21 DIAGNOSIS — F33.1 MDD (MAJOR DEPRESSIVE DISORDER), RECURRENT EPISODE, MODERATE: ICD-10-CM

## 2020-02-21 RX ORDER — ESCITALOPRAM OXALATE 20 MG/1
20 TABLET ORAL DAILY
Qty: 90 TABLET | Refills: 1 | Status: SHIPPED | OUTPATIENT
Start: 2020-02-21 | End: 2020-05-28 | Stop reason: SDUPTHER

## 2020-05-28 ENCOUNTER — OFFICE VISIT (OUTPATIENT)
Dept: PSYCHIATRY | Facility: CLINIC | Age: 85
End: 2020-05-28
Payer: COMMERCIAL

## 2020-05-28 VITALS
DIASTOLIC BLOOD PRESSURE: 85 MMHG | WEIGHT: 168.56 LBS | HEART RATE: 75 BPM | SYSTOLIC BLOOD PRESSURE: 176 MMHG | BODY MASS INDEX: 22.86 KG/M2

## 2020-05-28 DIAGNOSIS — F41.1 GENERALIZED ANXIETY DISORDER: Primary | ICD-10-CM

## 2020-05-28 DIAGNOSIS — F33.1 MDD (MAJOR DEPRESSIVE DISORDER), RECURRENT EPISODE, MODERATE: ICD-10-CM

## 2020-05-28 DIAGNOSIS — F33.42 MDD (MAJOR DEPRESSIVE DISORDER), RECURRENT, IN FULL REMISSION: ICD-10-CM

## 2020-05-28 PROCEDURE — 1159F PR MEDICATION LIST DOCUMENTED IN MEDICAL RECORD: ICD-10-PCS | Mod: S$GLB,,, | Performed by: PSYCHIATRY & NEUROLOGY

## 2020-05-28 PROCEDURE — 99214 OFFICE O/P EST MOD 30 MIN: CPT | Mod: S$GLB,,, | Performed by: PSYCHIATRY & NEUROLOGY

## 2020-05-28 PROCEDURE — 99999 PR PBB SHADOW E&M-EST. PATIENT-LVL II: ICD-10-PCS | Mod: PBBFAC,,, | Performed by: PSYCHIATRY & NEUROLOGY

## 2020-05-28 PROCEDURE — 1159F MED LIST DOCD IN RCRD: CPT | Mod: S$GLB,,, | Performed by: PSYCHIATRY & NEUROLOGY

## 2020-05-28 PROCEDURE — 99999 PR PBB SHADOW E&M-EST. PATIENT-LVL II: CPT | Mod: PBBFAC,,, | Performed by: PSYCHIATRY & NEUROLOGY

## 2020-05-28 PROCEDURE — 99214 PR OFFICE/OUTPT VISIT, EST, LEVL IV, 30-39 MIN: ICD-10-PCS | Mod: S$GLB,,, | Performed by: PSYCHIATRY & NEUROLOGY

## 2020-05-28 RX ORDER — ESCITALOPRAM OXALATE 20 MG/1
20 TABLET ORAL DAILY
Qty: 90 TABLET | Refills: 4 | Status: SHIPPED | OUTPATIENT
Start: 2020-05-28 | End: 2021-09-23 | Stop reason: SDUPTHER

## 2020-05-28 NOTE — PROGRESS NOTES
"Outpatient Psychiatry Follow-Up Visit (MD/NP)    5/28/2020    Clinical Status of Patient:  Outpatient (Ambulatory)    Session Length:  30 minutes (E&M level 4)      Chief Complaint:  Aleksandra Jacques is a 85 y.o. male who presents today for follow-up of anxiety, panic attacks, depression.     Met with patient.      Interval History and Content of Current Session:  Interim Events/Subjective Report/Content of Current Session: First appointment with me since 6/4/2018 (almost 2 years).   Medication plan at last appt:  "Continue Lexapro 20 mg one tablet daily.  Pt may use Xanax 0.5 mg up to twice daily as needed for anxiety or insomnia, but pt told to avoid using unless necessary.   Pt may use Remeron prn insomnia.    90 days supply Rx was written for Lexapro ONLY at pt's request."      He states he has been doing "fine".    He denies complaints today.    Pt states he has been taking the escitalopram 20 mg daily.    He denies any AE's.    He also notes he is provided this medication at no cost (no copay).   He has not needed to take either the Xanax or mirtazapine in years.       Current SIGECAPS:    Sleep -- good; he needs no meds to fall asleep.  He is able to fall asleep OK and sleeps about 8 hours.      Interests -- stays busy; he will do work around the house.  They have someone who does their lawn.  He also does a fair amount of exercise daily.    Guilt -- denies excessive guilt    Energy -- "pretty good"; less tired overall   Concentration -- "all right"   Appetite -- "OK"   Psychomotor -- normal for age    Suicidal ideation -- denies hopelessness or SI.     He has had no significant medical issues recently.    They have been dealing with the COVID restrictions well overall.    They have avoided crowds.    They would like to attend a Mass at their [employer].  They have been watching Masses on line.     He and his wife have been busy with projects.  He is actually painting the trim around the house.    They have 2 " "daughters and a son.  They are close to them.    They have 2 grandsons and 1 granddaughter.    They are doing OK financially.      He realizes he has some ST memory loss, but this has been minimal.  Pt and wife do not see it as much of a problem at this time.     Psychotherapy:  N/A      From previous session:  Also, when discussing most recent EKG, pt stated he had "skipped" heartbeat in 2014, which is why he had the EKG done in the ED.  His brother was suffering from heart disease and told pt about his Sx.  Pt stated he then realized he was having some of these Sx (occasional skipped heartbeat), so the went to ED.  He states the doctor in the ED told him his EKG was fine (he actually had 2 PVC's noted in 6 recorded beats on the EKG).  Pt DENIES having ANY recent Sx that would be concerning for any underlying heart disease.    His brother was also a lifelong smoker; pt has never smoked.     Review of Systems   · PSYCHIATRIC: Pertinant items are noted in the narrative.  · CONSTITUTIONAL:  No significant changes in wt.    · MUSCULOSKELETAL: No significant pain or stiffness of the joints.  · NEUROLOGIC: No weakness, sensory changes, seizures, confusion, memory loss, tremor or other abnormal movements.  · ENDOCRINE: No polydipsia or polyuria.  · INTEGUMENTARY: No rashes or lacerations.  · EYES: No exophthalmos, jaundice or blindness.  · ENT: No dizziness, tinnitus or hearing loss.  · RESPIRATORY: No shortness of breath or dyspnea on exertion.  · CARDIOVASCULAR: No tachycardia, chest pain, occasional palpitations/"skipped" heart beats.  · GASTROINTESTINAL: No nausea, vomiting, pain, constipation or diarrhea.  · GENITOURINARY: No frequency, dysuria.      Past Medical, Family and Social History: The patient's past medical, family and social history have been reviewed and updated as appropriate within the electronic medical record -- see encounter notes including my initial eval from 7/2/2014.      Current Outpatient " "Medications:     cholecalciferol, vitamin D3, (VITAMIN D3) 1,000 unit capsule, Take 2,000 Units by mouth once daily., Disp: , Rfl:     escitalopram oxalate (LEXAPRO) 20 MG tablet, Take 1 tablet (20 mg total) by mouth once daily., Disp: 90 tablet, Rfl: 1  Pt is NOT taking Xanax or Remeron currently.    Compliance:  Yes.       Side effects: None    Risk Parameters:  Patient reports no suicidal ideation  Patient reports no homicidal ideation  Patient reports no self-injurious behavior  Patient reports no violent behavior    Exam (detailed: at least 9 elements; comprehensive: all 15 elements)   Constitutional  Vitals:  Most recent vital signs, dated less than 90 days prior to this appointment, were reviewed.     Vitals - 1 value per visit 10/23/2019 12/18/2019 5/28/2020   SYSTOLIC 162*  181*   DIASTOLIC 85  93   PULSE 85  87   TEMPERATURE      RESPIRATIONS      SPO2      Weight (lb) 167  168.54   Weight (kg) 75.751  76.45   HEIGHT 6' 0"     BODY MASS INDEX 22.65  22.86   VISIT REPORT      Pain Score  0 0    *pt states he thinks he has "white coat hypertension"; his blood pressures have always been higher compared to outside the physicians' office    Vitals - 1 value per visit 2/2/2018 2/5/2018 6/4/2018   SYSTOLIC 180 152 161   DIASTOLIC 85 76 76   PULSE 81 77 72   TEMPERATURE 97.9     RESPIRATIONS      SPO2      Weight (lb) 170.64 168.87 167.33   Weight (kg) 77.4 76.6 75.9   HEIGHT      BODY MASS INDEX 23.14 22.9 22.69   VISIT REPORT      Pain Score  0 0        Vitals - 1 value per visit 3/2/2015 9/13/2017 11/30/2017   SYSTOLIC 156* 172* 167*   DIASTOLIC 76 87 79   PULSE 82 82 88   TEMPERATURE      RESPIRATIONS      SPO2      Weight (lb) 167 151 161.8   Weight (kg) 75.751 68.493 73.392   HEIGHT 6' 0" 6' 0" 6' 0"   BODY MASS INDEX 22.64 20.48 21.94   VISIT REPORT      Pain Score            General:  unremarkable, age appropriate, normal weight, well dressed, neatly groomed, friendly, cooperative, good eye contact, " engaging     Musculoskeletal  Muscle Strength/Tone:  not examined   Gait & Station:  non-ataxic     Psychiatric  Speech:  no latency; no press, spontaneous, good articulation   Mood & Affect:  euthymic  congruent and appropriate   Thought Process:  goal-directed, logical   Associations:  intact   Thought Content:  normal, no suicidality, no homicidality, delusions, or paranoia   Insight:  intact   Judgement: behavior is adequate to circumstances   Orientation:  grossly intact   Memory: intact for content of interview   Language: grossly intact   Attention Span & Concentration:  able to focus   Fund of Knowledge:  intact and appropriate to age and level of education     EKG (9/13/17):    Test Reason : F33.1  Vent. Rate : 079 BPM     Atrial Rate : 079 BPM     P-R Int : 166 ms          QRS Dur : 102 ms      QT Int : 374 ms       P-R-T Axes : 068 075 063 degrees     QTc Int : 428 ms  Sinus rhythm with frequent Premature ventricular complexes  Otherwise normal ECG  When compared with ECG of 18-MAY-2014 08:19,  No significant change was found  Confirmed by TITO WETZEL MD ([id number]) on 9/13/2017 4:53:01 PM    Assessment and Diagnosis   Status/Progress: Based on the examination today, the patient's problem(s) is/are well controlled.  New problems have not been presented today.   Co-morbidities are not complicating management of the primary condition.  There are no active rule-out diagnoses for this patient at this time.     Impression:   Generalized Anxiety Disorder   Major Depressive Disorder, recurrent, in full remission    Intervention/Counseling/Treatment Plan   Medication Management:  Continue Lexapro 20 mg one tablet daily.  90 days supply e-Rx was sent to [facility]'s pharmacy at pt's request.      Additional Note:  I have encouraged pt to obtain a PCP.  I encouraged pt to monitor blood pressures often.      Return to Clinic: 1 year, or sooner prn.  I told pt if he prefers he could f/u with a PCP for the Lexapro.

## 2020-11-12 NOTE — OP NOTE
DATE OF PROCEDURE:  01/31/2018    PREOPERATIVE DIAGNOSIS:  Left neck lipoma measuring 6 cm in greatest dimension.    POSTOPERATIVE DIAGNOSIS:  Left neck lipoma measuring 6 cm in greatest dimension.    PROCEDURE:  Resection of left neck lipoma.    SURGEON:  Jesús Hussein M.D.    ASSISTANT:  Fabian Brooks M.D. (RES)    ANESTHESIA:  General endotracheal.    ESTIMATED BLOOD LOSS:  Minimal.    SPECIMENS:  Left neck lipoma for permanent.    INDICATIONS FOR PROCEDURE:  Mr. 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  He recently noted this lesion was growing   and he expressed his desire to undergo resection.  He was apprised of the   risks, benefits, and alternatives to surgery.  In spite of the risks inherent to   surgery, he provided informed consent.    PROCEDURE IN DETAIL:  The patient was taken to the Operating Room and placed on   the operating table in a supine position.  General endotracheal anesthesia was   induced by the Anesthesia Team.  The left neck was addressed.  An incision was   made in a natural skin crease in the inferior aspect of the palpable lipoma.    The incision was then injected with several mL of 1% lidocaine with epinephrine.    The neck was then prepped and draped in a standard sterile fashion.    To begin, a #15 blade was utilized to incise the skin with sharp dissection   proceeding through the underlying subcutaneous tissue and platysmal muscle.    Just deep to the platysma, the lipoma was identified.  Superiorly and inferiorly   based subplatysmal flaps were elevated from roughly the level of the thyroid   notch up to the level of the mandible.  The lipoma was then  from the   sternocleidomastoid muscle and the posterior aspect utilizing the Bovie.  It was   then  from its deep aspect from the internal jugular vein and facial   vein and subsequently from the submandibular gland.  Finally, it was    from its  attachments to the strap musculature anteriorly.  Once it had been   completely amputated, it was sent to Pathology for permanent analysis.    Hemostasis was achieved with electrocautery and the neck was closed over a   10-S[place] Breezy drain, which was brought out posteriorly and secured with a silk   suture.  The neck was closed utilizing 3-0 Vicryl, 4-0 Monocryl, and Dermabond.    Once the wound was closed, the patient was handed back to Anesthesia,   awakened, extubated, and transported to Recovery in satisfactory condition.    There were no intraoperative complications.  I was present and participated in   the entire procedure.      CP[facility]/YAMILA  dd: 01/31/2018 15:53:20 (CST)  td: 01/31/2018 18:19:00 (CST)  Doc ID   #[id number]  Job ID #[id number]    CC:    None known

## 2020-12-15 ENCOUNTER — OFFICE VISIT (OUTPATIENT)
Dept: INTERNAL MEDICINE | Facility: CLINIC | Age: 85
End: 2020-12-15
Payer: COMMERCIAL

## 2020-12-15 VITALS
HEIGHT: 70 IN | OXYGEN SATURATION: 100 % | SYSTOLIC BLOOD PRESSURE: 146 MMHG | DIASTOLIC BLOOD PRESSURE: 82 MMHG | BODY MASS INDEX: 23.89 KG/M2 | HEART RATE: 78 BPM | WEIGHT: 166.88 LBS | TEMPERATURE: 98 F

## 2020-12-15 DIAGNOSIS — K40.20 BILATERAL INGUINAL HERNIA WITHOUT OBSTRUCTION OR GANGRENE, RECURRENCE NOT SPECIFIED: ICD-10-CM

## 2020-12-15 DIAGNOSIS — R03.0 ELEVATED BLOOD-PRESSURE READING WITHOUT DIAGNOSIS OF HYPERTENSION: ICD-10-CM

## 2020-12-15 DIAGNOSIS — F32.A DEPRESSION, UNSPECIFIED DEPRESSION TYPE: ICD-10-CM

## 2020-12-15 DIAGNOSIS — Z00.00 ENCOUNTER FOR PREVENTIVE HEALTH EXAMINATION: Primary | ICD-10-CM

## 2020-12-15 PROCEDURE — 1101F PR PT FALLS ASSESS DOC 0-1 FALLS W/OUT INJ PAST YR: ICD-10-PCS | Mod: CPTII,S$GLB,, | Performed by: INTERNAL MEDICINE

## 2020-12-15 PROCEDURE — 1101F PT FALLS ASSESS-DOCD LE1/YR: CPT | Mod: CPTII,S$GLB,, | Performed by: INTERNAL MEDICINE

## 2020-12-15 PROCEDURE — 3288F FALL RISK ASSESSMENT DOCD: CPT | Mod: CPTII,S$GLB,, | Performed by: INTERNAL MEDICINE

## 2020-12-15 PROCEDURE — 1126F PR PAIN SEVERITY QUANTIFIED, NO PAIN PRESENT: ICD-10-PCS | Mod: S$GLB,,, | Performed by: INTERNAL MEDICINE

## 2020-12-15 PROCEDURE — 1126F AMNT PAIN NOTED NONE PRSNT: CPT | Mod: S$GLB,,, | Performed by: INTERNAL MEDICINE

## 2020-12-15 PROCEDURE — 3288F PR FALLS RISK ASSESSMENT DOCUMENTED: ICD-10-PCS | Mod: CPTII,S$GLB,, | Performed by: INTERNAL MEDICINE

## 2020-12-15 PROCEDURE — 99397 PR PREVENTIVE VISIT,EST,65 & OVER: ICD-10-PCS | Mod: S$GLB,,, | Performed by: INTERNAL MEDICINE

## 2020-12-15 PROCEDURE — 99999 PR PBB SHADOW E&M-EST. PATIENT-LVL III: CPT | Mod: PBBFAC,,, | Performed by: INTERNAL MEDICINE

## 2020-12-15 PROCEDURE — 99999 PR PBB SHADOW E&M-EST. PATIENT-LVL III: ICD-10-PCS | Mod: PBBFAC,,, | Performed by: INTERNAL MEDICINE

## 2020-12-15 PROCEDURE — 99397 PER PM REEVAL EST PAT 65+ YR: CPT | Mod: S$GLB,,, | Performed by: INTERNAL MEDICINE

## 2020-12-15 NOTE — PROGRESS NOTES
History of present illness:  85-year-old gentleman in today for general health assessment.  Previously seen Dr. Weiss.    Current medications:  Lexapro.    Review of systems:  General: no fever, chills, generalized body aches. No unexpected weight loss.  Eyes:  No visual disturbances.  HEENT:  No hoarseness, dysphagia, ear pain.  Respiratory:  No cough, no shortness of breath.  Cardiovascular: no chest pain, palpitations, cough, exertional limb pain. No edema.  GI: no nausea, vomiting.  No abdominal pain. No change in bowel habits.  No melena, no hematochezia.  : no dysuria. No change in the color or character of the urine. No urinary frequency.  Musculoskeletal: no joint pain or swelling.  Neurologic:  No focal neurological complaints.  No headaches.  Skin:  No rashes or other concerns.  Psych:  He feels his depression and anxiety are well controlled.    Past medical history, past surgical history, family medical history social history is are all noted and reviewed in our electronic medical record history sections.    Health screenings:  He has had both pneumococcal vaccines.  Fecal immunochemical testing January 2018.  He reports being up-to-date with eye exam.    Physical examination:  GENERAL:  Alert, appropriately groomed, no acute distress.  VS:  Blood pressure taken manually by this examiner is 160/88  EYES: sclerae white ,nonicteric. PERRL.  HEENT:  Normocephalic. Ear canals and tympanic membranes normal. Mouth and pharynx normal. No thyromegaly. Trachea midline and freely mobile.  LUNGS:  Clear to ascultation and normal to percussion.  CARDIOVASCULAR:  Normal heart sounds.  There is a high-pitched, squeaky 2/6 systolic murmur heard best at the left sternal border. Carotids full bilaterally without bruit.  Pedal pulses intact .  No abdominal bruit.  No peripheral extremity edema.  GI: the abdomen is soft, no distension. No masses , tenderness, organomegaly.  Large inguinal hernias bilaterally  nontender.  : scrotum, testicles and penis normal.  See above.  LYMPHATIC:  No axillary, inguinal , cervical adenopathy.  MUSCULOSKELETAL:  Range of motion, stability and strength of the right and left upper and lower extremities normal. No swollen or tender joints  NEUROLOGIC:  DTR's normal. No gross motor or sensory deficits apparent, gait normal.  SKIN:  No rashes.   MS:  Alert, oriented , affect and mood all appropriate      Impression:  Generally healthy 85-year-old gentleman living healthy lifestyle.  Elevated blood pressure reading in the office though he reports home blood pressure readings are normal.  Depression anxiety disorder controlled and followed by Psychiatry.  Bilateral inguinal hernias.    Plan:  Update health maintenance laboratory data to include CBC, chemistry profile, lipid profile, TSH, urinalysis.  Advise return to clinic in 4-6 weeks with his home blood pressure monitor with readings for review and to validate accuracy of monitor.  Referral to general surgery.

## 2020-12-16 ENCOUNTER — LAB VISIT (OUTPATIENT)
Dept: LAB | Facility: HOSPITAL | Age: 85
End: 2020-12-16
Attending: INTERNAL MEDICINE
Payer: COMMERCIAL

## 2020-12-16 ENCOUNTER — OFFICE VISIT (OUTPATIENT)
Dept: SURGERY | Facility: CLINIC | Age: 85
End: 2020-12-16
Payer: COMMERCIAL

## 2020-12-16 VITALS
WEIGHT: 166.88 LBS | SYSTOLIC BLOOD PRESSURE: 124 MMHG | HEIGHT: 70 IN | DIASTOLIC BLOOD PRESSURE: 80 MMHG | BODY MASS INDEX: 23.89 KG/M2

## 2020-12-16 DIAGNOSIS — K40.20 BILATERAL INGUINAL HERNIA WITHOUT OBSTRUCTION OR GANGRENE, RECURRENCE NOT SPECIFIED: ICD-10-CM

## 2020-12-16 DIAGNOSIS — Z00.00 ENCOUNTER FOR PREVENTIVE HEALTH EXAMINATION: ICD-10-CM

## 2020-12-16 LAB
ALBUMIN SERPL BCP-MCNC: 4 G/DL (ref 3.5–5.2)
ALP SERPL-CCNC: 52 U/L (ref 55–135)
ALT SERPL W/O P-5'-P-CCNC: 13 U/L (ref 10–44)
ANION GAP SERPL CALC-SCNC: 9 MMOL/L (ref 8–16)
AST SERPL-CCNC: 23 U/L (ref 10–40)
BASOPHILS # BLD AUTO: 0.08 K/UL (ref 0–0.2)
BASOPHILS NFR BLD: 1.3 % (ref 0–1.9)
BILIRUB SERPL-MCNC: 0.6 MG/DL (ref 0.1–1)
BUN SERPL-MCNC: 15 MG/DL (ref 8–23)
CALCIUM SERPL-MCNC: 9.2 MG/DL (ref 8.7–10.5)
CHLORIDE SERPL-SCNC: 104 MMOL/L (ref 95–110)
CHOLEST SERPL-MCNC: 151 MG/DL (ref 120–199)
CHOLEST/HDLC SERPL: 3.7 {RATIO} (ref 2–5)
CO2 SERPL-SCNC: 27 MMOL/L (ref 23–29)
CREAT SERPL-MCNC: 0.9 MG/DL (ref 0.5–1.4)
DIFFERENTIAL METHOD: ABNORMAL
EOSINOPHIL # BLD AUTO: 0.2 K/UL (ref 0–0.5)
EOSINOPHIL NFR BLD: 2.5 % (ref 0–8)
ERYTHROCYTE [DISTWIDTH] IN BLOOD BY AUTOMATED COUNT: 12.4 % (ref 11.5–14.5)
EST. GFR  (AFRICAN AMERICAN): >60 ML/MIN/1.73 M^2
EST. GFR  (NON AFRICAN AMERICAN): >60 ML/MIN/1.73 M^2
GLUCOSE SERPL-MCNC: 89 MG/DL (ref 70–110)
HCT VFR BLD AUTO: 45.4 % (ref 40–54)
HDLC SERPL-MCNC: 41 MG/DL (ref 40–75)
HDLC SERPL: 27.2 % (ref 20–50)
HGB BLD-MCNC: 14.5 G/DL (ref 14–18)
IMM GRANULOCYTES # BLD AUTO: 0.01 K/UL (ref 0–0.04)
IMM GRANULOCYTES NFR BLD AUTO: 0.2 % (ref 0–0.5)
LDLC SERPL CALC-MCNC: 96.6 MG/DL (ref 63–159)
LYMPHOCYTES # BLD AUTO: 1.3 K/UL (ref 1–4.8)
LYMPHOCYTES NFR BLD: 20.5 % (ref 18–48)
MCH RBC QN AUTO: 31.4 PG (ref 27–31)
MCHC RBC AUTO-ENTMCNC: 31.9 G/DL (ref 32–36)
MCV RBC AUTO: 98 FL (ref 82–98)
MONOCYTES # BLD AUTO: 0.8 K/UL (ref 0.3–1)
MONOCYTES NFR BLD: 12.5 % (ref 4–15)
NEUTROPHILS # BLD AUTO: 3.8 K/UL (ref 1.8–7.7)
NEUTROPHILS NFR BLD: 63 % (ref 38–73)
NONHDLC SERPL-MCNC: 110 MG/DL
NRBC BLD-RTO: 0 /100 WBC
PLATELET # BLD AUTO: 235 K/UL (ref 150–350)
PMV BLD AUTO: 11.2 FL (ref 9.2–12.9)
POTASSIUM SERPL-SCNC: 4.4 MMOL/L (ref 3.5–5.1)
PROT SERPL-MCNC: 7.7 G/DL (ref 6–8.4)
RBC # BLD AUTO: 4.62 M/UL (ref 4.6–6.2)
SODIUM SERPL-SCNC: 140 MMOL/L (ref 136–145)
TRIGL SERPL-MCNC: 67 MG/DL (ref 30–150)
TSH SERPL DL<=0.005 MIU/L-ACNC: 2.78 UIU/ML (ref 0.4–4)
WBC # BLD AUTO: 6.09 K/UL (ref 3.9–12.7)

## 2020-12-16 PROCEDURE — 99999 PR PBB SHADOW E&M-EST. PATIENT-LVL III: CPT | Mod: PBBFAC,,, | Performed by: STUDENT IN AN ORGANIZED HEALTH CARE EDUCATION/TRAINING PROGRAM

## 2020-12-16 PROCEDURE — 1159F MED LIST DOCD IN RCRD: CPT | Mod: S$GLB,,, | Performed by: STUDENT IN AN ORGANIZED HEALTH CARE EDUCATION/TRAINING PROGRAM

## 2020-12-16 PROCEDURE — 85025 COMPLETE CBC W/AUTO DIFF WBC: CPT

## 2020-12-16 PROCEDURE — 99999 PR PBB SHADOW E&M-EST. PATIENT-LVL III: ICD-10-PCS | Mod: PBBFAC,,, | Performed by: STUDENT IN AN ORGANIZED HEALTH CARE EDUCATION/TRAINING PROGRAM

## 2020-12-16 PROCEDURE — 80061 LIPID PANEL: CPT

## 2020-12-16 PROCEDURE — 1126F AMNT PAIN NOTED NONE PRSNT: CPT | Mod: S$GLB,,, | Performed by: STUDENT IN AN ORGANIZED HEALTH CARE EDUCATION/TRAINING PROGRAM

## 2020-12-16 PROCEDURE — 1126F PR PAIN SEVERITY QUANTIFIED, NO PAIN PRESENT: ICD-10-PCS | Mod: S$GLB,,, | Performed by: STUDENT IN AN ORGANIZED HEALTH CARE EDUCATION/TRAINING PROGRAM

## 2020-12-16 PROCEDURE — 99204 OFFICE O/P NEW MOD 45 MIN: CPT | Mod: S$GLB,,, | Performed by: STUDENT IN AN ORGANIZED HEALTH CARE EDUCATION/TRAINING PROGRAM

## 2020-12-16 PROCEDURE — 36415 COLL VENOUS BLD VENIPUNCTURE: CPT | Mod: PO

## 2020-12-16 PROCEDURE — 1101F PT FALLS ASSESS-DOCD LE1/YR: CPT | Mod: CPTII,S$GLB,, | Performed by: STUDENT IN AN ORGANIZED HEALTH CARE EDUCATION/TRAINING PROGRAM

## 2020-12-16 PROCEDURE — 84443 ASSAY THYROID STIM HORMONE: CPT

## 2020-12-16 PROCEDURE — 1159F PR MEDICATION LIST DOCUMENTED IN MEDICAL RECORD: ICD-10-PCS | Mod: S$GLB,,, | Performed by: STUDENT IN AN ORGANIZED HEALTH CARE EDUCATION/TRAINING PROGRAM

## 2020-12-16 PROCEDURE — 80053 COMPREHEN METABOLIC PANEL: CPT

## 2020-12-16 PROCEDURE — 99204 PR OFFICE/OUTPT VISIT, NEW, LEVL IV, 45-59 MIN: ICD-10-PCS | Mod: S$GLB,,, | Performed by: STUDENT IN AN ORGANIZED HEALTH CARE EDUCATION/TRAINING PROGRAM

## 2020-12-16 PROCEDURE — 3288F FALL RISK ASSESSMENT DOCD: CPT | Mod: CPTII,S$GLB,, | Performed by: STUDENT IN AN ORGANIZED HEALTH CARE EDUCATION/TRAINING PROGRAM

## 2020-12-16 PROCEDURE — 1101F PR PT FALLS ASSESS DOC 0-1 FALLS W/OUT INJ PAST YR: ICD-10-PCS | Mod: CPTII,S$GLB,, | Performed by: STUDENT IN AN ORGANIZED HEALTH CARE EDUCATION/TRAINING PROGRAM

## 2020-12-16 PROCEDURE — 3288F PR FALLS RISK ASSESSMENT DOCUMENTED: ICD-10-PCS | Mod: CPTII,S$GLB,, | Performed by: STUDENT IN AN ORGANIZED HEALTH CARE EDUCATION/TRAINING PROGRAM

## 2020-12-16 NOTE — LETTER
December 16, 2020      LINDA Rose MD  [address]  [place] [address]           [facility]  [address]  [place] [address]  Phone: [phone]          Patient: Aleksandra Jacques   MR Number: [id number]   YOB: 1935   Date of Visit: 12/16/2020       Dear Dr. LINDA Rose:    Thank you for referring Aleksandra Jacques to me for evaluation. Attached you will find relevant portions of my assessment and plan of care.    If you have questions, please do not hesitate to call me. I look forward to following Aleksandra Jacques along with you.    Sincerely,    Antonio Thibodeaux MD    Enclosure  CC:  No Recipients    If you would like to receive this communication electronically, please contact [email or web address] or [phone] to request more information on [employer] access.    For providers and/or their staff who would like to refer a patient to [facility], please contact us through our one-stop-shop provider referral line, [facility] , at [phone].    If you feel you have received this communication in error or would no longer like to receive these types of communications, please e-mail [email or web address]

## 2020-12-16 NOTE — PROGRESS NOTES
Patient ID: Aleksandra Jacques is a 85 y.o. male.    Chief Complaint: No chief complaint on file.      HPI:  85M with bilateral groin bulge for about 15 years. Noticed both around the same time. Not really worsening. No hx of groin surgery. Did have TURP for BPH, voiding well. Denies pain either side. Able to do whatever activities he wants. No obstructive symptoms.    Review of Systems   Constitutional: Negative for chills, diaphoresis and fever.   HENT: Negative for trouble swallowing.    Respiratory: Negative for cough, shortness of breath, wheezing and stridor.    Cardiovascular: Negative for chest pain and palpitations.   Gastrointestinal: Negative for abdominal distention, abdominal pain, blood in stool, diarrhea, nausea and vomiting.   Endocrine: Negative for cold intolerance and heat intolerance.   Genitourinary: Negative for difficulty urinating.   Musculoskeletal: Negative for back pain.   Skin: Negative for rash.   Allergic/Immunologic: Negative for immunocompromised state.   Neurological: Negative for dizziness, syncope and numbness.   Hematological: Negative for adenopathy.   Psychiatric/Behavioral: Negative for agitation.       Current Outpatient Medications   Medication Sig Dispense Refill    cholecalciferol, vitamin D3, (VITAMIN D3) 1,000 unit capsule Take 2,000 Units by mouth once daily.      escitalopram oxalate (LEXAPRO) 20 MG tablet Take 1 tablet (20 mg total) by mouth once daily. 90 tablet 4     No current facility-administered medications for this visit.        Review of patient's allergies indicates:   Allergen Reactions    Oregano Other (See Comments)     Abdominal bloating, flatus, irritable bowel       Past Medical History:   Diagnosis Date    Anxiety     Cataract     Depression     Hypertension        Past Surgical History:   Procedure Laterality Date    APPENDECTOMY      COLONOSCOPY      PROSTATE SURGERY      TESTICLE BIOPSY      TESTICLE SURGERY         Family History    Problem Relation Age of Onset    Arthritis Mother     Alcohol abuse Father     Cancer Father     Depression Father     Early death Father     Heart disease Father     Hypertension Father     Alcohol abuse Sister     Depression Sister     Dementia Sister     Depression Brother     Alcohol abuse Brother        Social History     Socioeconomic History    Marital status:      Spouse name: Not on file    Number of children: 3    Years of education: Not on file    Highest education level: Not on file   Occupational History    Not on file   Social Needs    Financial resource strain: Not on file    Food insecurity     Worry: Not on file     Inability: Not on file    Transportation needs     Medical: Not on file     Non-medical: Not on file   Tobacco Use    Smoking status: Never Smoker    Smokeless tobacco: Never Used   Substance and Sexual Activity    Alcohol use: Yes     Alcohol/week: 1.0 standard drinks     Types: 1 Shots of liquor per week     Comment: every day    Drug use: No    Sexual activity: Not on file   Lifestyle    Physical activity     Days per week: Not on file     Minutes per session: Not on file    Stress: Not on file   Relationships    Social connections     Talks on phone: Not on file     Gets together: Not on file     Attends [employer] service: Not on file     Active member of club or organization: Not on file     Attends meetings of clubs or organizations: Not on file     Relationship status: Not on file   Other Topics Concern    Patient feels they ought to cut down on drinking/drug use Not Asked    Patient annoyed by others criticizing their drinking/drug use Not Asked    Patient has felt bad or guilty about drinking/drug use Not Asked    Patient has had a drink/used drugs as an eye opener in the AM Not Asked   Social History Narrative    Not on file       Vitals:    12/16/20 0917   BP: 124/80       Physical Exam  Constitutional:       General: He is not in  "acute distress.  HENT:      Head: Normocephalic and atraumatic.   Eyes:      General: No scleral icterus.  Cardiovascular:      Rate and Rhythm: Normal rate.   Pulmonary:      Effort: Pulmonary effort is normal. No respiratory distress.      Breath sounds: No stridor.   Abdominal:      Palpations: Abdomen is soft.      Tenderness: There is no abdominal tenderness.      Comments: Reducible RIH, nontender  Left hydrocele   Lymphadenopathy:      Cervical: No cervical adenopathy.   Skin:     General: Skin is warm.      Findings: No erythema.   Neurological:      Mental Status: He is alert and oriented to person, place, and time.   Psychiatric:         Behavior: Behavior normal.       Cardiac echo in 2017 - EF normal, no signs of wall motion abnormalitiy or ischemic changes    Assessment & Plan:   85M with RIH, left hydrocele  Not bothersome, no change in many years. Safe to continue observation   "not looking to have surgery"  Let us know if anything changes    "

## 2020-12-18 ENCOUNTER — TELEPHONE (OUTPATIENT)
Dept: INTERNAL MEDICINE | Facility: CLINIC | Age: 85
End: 2020-12-18

## 2020-12-18 NOTE — TELEPHONE ENCOUNTER
----- Message from Deedee Montero sent at 12/18/2020 12:39 PM CST -----  Contact: [phone]  Patient is requesting a call back in regards to a letter he received through the mail from Dr. Rose's office.Will further discuss upon call. Please call and advise.

## 2021-03-02 ENCOUNTER — OFFICE VISIT (OUTPATIENT)
Dept: INTERNAL MEDICINE | Facility: CLINIC | Age: 86
End: 2021-03-02
Payer: COMMERCIAL

## 2021-03-02 VITALS
SYSTOLIC BLOOD PRESSURE: 150 MMHG | HEART RATE: 65 BPM | HEIGHT: 72 IN | DIASTOLIC BLOOD PRESSURE: 78 MMHG | BODY MASS INDEX: 22.25 KG/M2 | OXYGEN SATURATION: 96 % | WEIGHT: 164.25 LBS | TEMPERATURE: 98 F | RESPIRATION RATE: 14 BRPM

## 2021-03-02 DIAGNOSIS — I10 HTN (HYPERTENSION), BENIGN: Primary | ICD-10-CM

## 2021-03-02 PROCEDURE — 1101F PR PT FALLS ASSESS DOC 0-1 FALLS W/OUT INJ PAST YR: ICD-10-PCS | Mod: CPTII,S$GLB,, | Performed by: INTERNAL MEDICINE

## 2021-03-02 PROCEDURE — 99213 OFFICE O/P EST LOW 20 MIN: CPT | Mod: S$GLB,,, | Performed by: INTERNAL MEDICINE

## 2021-03-02 PROCEDURE — 3077F SYST BP >= 140 MM HG: CPT | Mod: CPTII,S$GLB,, | Performed by: INTERNAL MEDICINE

## 2021-03-02 PROCEDURE — 99999 PR PBB SHADOW E&M-EST. PATIENT-LVL III: CPT | Mod: PBBFAC,,, | Performed by: INTERNAL MEDICINE

## 2021-03-02 PROCEDURE — 3288F FALL RISK ASSESSMENT DOCD: CPT | Mod: CPTII,S$GLB,, | Performed by: INTERNAL MEDICINE

## 2021-03-02 PROCEDURE — 99213 PR OFFICE/OUTPT VISIT, EST, LEVL III, 20-29 MIN: ICD-10-PCS | Mod: S$GLB,,, | Performed by: INTERNAL MEDICINE

## 2021-03-02 PROCEDURE — 99999 PR PBB SHADOW E&M-EST. PATIENT-LVL III: ICD-10-PCS | Mod: PBBFAC,,, | Performed by: INTERNAL MEDICINE

## 2021-03-02 PROCEDURE — 1101F PT FALLS ASSESS-DOCD LE1/YR: CPT | Mod: CPTII,S$GLB,, | Performed by: INTERNAL MEDICINE

## 2021-03-02 PROCEDURE — 3078F PR MOST RECENT DIASTOLIC BLOOD PRESSURE < 80 MM HG: ICD-10-PCS | Mod: CPTII,S$GLB,, | Performed by: INTERNAL MEDICINE

## 2021-03-02 PROCEDURE — 3078F DIAST BP <80 MM HG: CPT | Mod: CPTII,S$GLB,, | Performed by: INTERNAL MEDICINE

## 2021-03-02 PROCEDURE — 3077F PR MOST RECENT SYSTOLIC BLOOD PRESSURE >= 140 MM HG: ICD-10-PCS | Mod: CPTII,S$GLB,, | Performed by: INTERNAL MEDICINE

## 2021-03-02 PROCEDURE — 1159F PR MEDICATION LIST DOCUMENTED IN MEDICAL RECORD: ICD-10-PCS | Mod: S$GLB,,, | Performed by: INTERNAL MEDICINE

## 2021-03-02 PROCEDURE — 3288F PR FALLS RISK ASSESSMENT DOCUMENTED: ICD-10-PCS | Mod: CPTII,S$GLB,, | Performed by: INTERNAL MEDICINE

## 2021-03-02 PROCEDURE — 1159F MED LIST DOCD IN RCRD: CPT | Mod: S$GLB,,, | Performed by: INTERNAL MEDICINE

## 2021-03-02 RX ORDER — LOSARTAN POTASSIUM 25 MG/1
25 TABLET ORAL DAILY
Qty: 90 TABLET | Refills: 3 | Status: SHIPPED | OUTPATIENT
Start: 2021-03-02 | End: 2021-10-07 | Stop reason: SDUPTHER

## 2021-03-10 ENCOUNTER — PATIENT OUTREACH (OUTPATIENT)
Dept: ADMINISTRATIVE | Facility: HOSPITAL | Age: 86
End: 2021-03-10

## 2021-03-24 ENCOUNTER — TELEPHONE (OUTPATIENT)
Dept: INTERNAL MEDICINE | Facility: CLINIC | Age: 86
End: 2021-03-24

## 2021-04-15 ENCOUNTER — LAB VISIT (OUTPATIENT)
Dept: LAB | Facility: HOSPITAL | Age: 86
End: 2021-04-15
Attending: INTERNAL MEDICINE
Payer: COMMERCIAL

## 2021-04-15 ENCOUNTER — OFFICE VISIT (OUTPATIENT)
Dept: INTERNAL MEDICINE | Facility: CLINIC | Age: 86
End: 2021-04-15
Payer: COMMERCIAL

## 2021-04-15 VITALS
HEART RATE: 72 BPM | DIASTOLIC BLOOD PRESSURE: 64 MMHG | SYSTOLIC BLOOD PRESSURE: 126 MMHG | HEIGHT: 72 IN | OXYGEN SATURATION: 100 % | TEMPERATURE: 97 F | BODY MASS INDEX: 22.33 KG/M2 | WEIGHT: 164.88 LBS

## 2021-04-15 DIAGNOSIS — I10 HTN (HYPERTENSION), BENIGN: Primary | ICD-10-CM

## 2021-04-15 DIAGNOSIS — I10 HTN (HYPERTENSION), BENIGN: ICD-10-CM

## 2021-04-15 LAB
ANION GAP SERPL CALC-SCNC: 8 MMOL/L (ref 8–16)
BUN SERPL-MCNC: 20 MG/DL (ref 8–23)
CALCIUM SERPL-MCNC: 9 MG/DL (ref 8.7–10.5)
CHLORIDE SERPL-SCNC: 108 MMOL/L (ref 95–110)
CO2 SERPL-SCNC: 27 MMOL/L (ref 23–29)
CREAT SERPL-MCNC: 0.9 MG/DL (ref 0.5–1.4)
EST. GFR  (AFRICAN AMERICAN): >60 ML/MIN/1.73 M^2
EST. GFR  (NON AFRICAN AMERICAN): >60 ML/MIN/1.73 M^2
GLUCOSE SERPL-MCNC: 81 MG/DL (ref 70–110)
POTASSIUM SERPL-SCNC: 4.2 MMOL/L (ref 3.5–5.1)
SODIUM SERPL-SCNC: 143 MMOL/L (ref 136–145)

## 2021-04-15 PROCEDURE — 3078F PR MOST RECENT DIASTOLIC BLOOD PRESSURE < 80 MM HG: ICD-10-PCS | Mod: CPTII,S$GLB,, | Performed by: INTERNAL MEDICINE

## 2021-04-15 PROCEDURE — 1159F MED LIST DOCD IN RCRD: CPT | Mod: S$GLB,,, | Performed by: INTERNAL MEDICINE

## 2021-04-15 PROCEDURE — 99999 PR PBB SHADOW E&M-EST. PATIENT-LVL III: CPT | Mod: PBBFAC,,, | Performed by: INTERNAL MEDICINE

## 2021-04-15 PROCEDURE — 3074F SYST BP LT 130 MM HG: CPT | Mod: CPTII,S$GLB,, | Performed by: INTERNAL MEDICINE

## 2021-04-15 PROCEDURE — 99999 PR PBB SHADOW E&M-EST. PATIENT-LVL III: ICD-10-PCS | Mod: PBBFAC,,, | Performed by: INTERNAL MEDICINE

## 2021-04-15 PROCEDURE — 3288F FALL RISK ASSESSMENT DOCD: CPT | Mod: CPTII,S$GLB,, | Performed by: INTERNAL MEDICINE

## 2021-04-15 PROCEDURE — 36415 COLL VENOUS BLD VENIPUNCTURE: CPT | Mod: PO | Performed by: INTERNAL MEDICINE

## 2021-04-15 PROCEDURE — 1126F PR PAIN SEVERITY QUANTIFIED, NO PAIN PRESENT: ICD-10-PCS | Mod: S$GLB,,, | Performed by: INTERNAL MEDICINE

## 2021-04-15 PROCEDURE — 99213 OFFICE O/P EST LOW 20 MIN: CPT | Mod: S$GLB,,, | Performed by: INTERNAL MEDICINE

## 2021-04-15 PROCEDURE — 3074F PR MOST RECENT SYSTOLIC BLOOD PRESSURE < 130 MM HG: ICD-10-PCS | Mod: CPTII,S$GLB,, | Performed by: INTERNAL MEDICINE

## 2021-04-15 PROCEDURE — 1157F PR ADVANCE CARE PLAN OR EQUIV PRESENT IN MEDICAL RECORD: ICD-10-PCS | Mod: S$GLB,,, | Performed by: INTERNAL MEDICINE

## 2021-04-15 PROCEDURE — 1157F ADVNC CARE PLAN IN RCRD: CPT | Mod: S$GLB,,, | Performed by: INTERNAL MEDICINE

## 2021-04-15 PROCEDURE — 1101F PR PT FALLS ASSESS DOC 0-1 FALLS W/OUT INJ PAST YR: ICD-10-PCS | Mod: CPTII,S$GLB,, | Performed by: INTERNAL MEDICINE

## 2021-04-15 PROCEDURE — 3078F DIAST BP <80 MM HG: CPT | Mod: CPTII,S$GLB,, | Performed by: INTERNAL MEDICINE

## 2021-04-15 PROCEDURE — 1126F AMNT PAIN NOTED NONE PRSNT: CPT | Mod: S$GLB,,, | Performed by: INTERNAL MEDICINE

## 2021-04-15 PROCEDURE — 1159F PR MEDICATION LIST DOCUMENTED IN MEDICAL RECORD: ICD-10-PCS | Mod: S$GLB,,, | Performed by: INTERNAL MEDICINE

## 2021-04-15 PROCEDURE — 3288F PR FALLS RISK ASSESSMENT DOCUMENTED: ICD-10-PCS | Mod: CPTII,S$GLB,, | Performed by: INTERNAL MEDICINE

## 2021-04-15 PROCEDURE — 1101F PT FALLS ASSESS-DOCD LE1/YR: CPT | Mod: CPTII,S$GLB,, | Performed by: INTERNAL MEDICINE

## 2021-04-15 PROCEDURE — 80048 BASIC METABOLIC PNL TOTAL CA: CPT | Performed by: INTERNAL MEDICINE

## 2021-04-15 PROCEDURE — 99213 PR OFFICE/OUTPT VISIT, EST, LEVL III, 20-29 MIN: ICD-10-PCS | Mod: S$GLB,,, | Performed by: INTERNAL MEDICINE

## 2021-09-23 ENCOUNTER — PATIENT MESSAGE (OUTPATIENT)
Dept: PSYCHIATRY | Facility: CLINIC | Age: 86
End: 2021-09-23

## 2021-10-07 ENCOUNTER — PATIENT MESSAGE (OUTPATIENT)
Dept: INTERNAL MEDICINE | Facility: CLINIC | Age: 86
End: 2021-10-07

## 2021-10-07 RX ORDER — LOSARTAN POTASSIUM 25 MG/1
25 TABLET ORAL DAILY
Qty: 90 TABLET | Refills: 3 | Status: SHIPPED | OUTPATIENT
Start: 2021-10-07 | End: 2022-10-07

## 2022-01-11 ENCOUNTER — PATIENT MESSAGE (OUTPATIENT)
Dept: PSYCHIATRY | Facility: CLINIC | Age: 87
End: 2022-01-11
Payer: COMMERCIAL

## 2022-01-18 ENCOUNTER — DOCUMENTATION ONLY (OUTPATIENT)
Dept: PSYCHIATRY | Facility: CLINIC | Age: 87
End: 2022-01-18
Payer: COMMERCIAL
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
## 2022-01-20 ENCOUNTER — OFFICE VISIT (OUTPATIENT)
Dept: PSYCHIATRY | Facility: CLINIC | Age: 87
End: 2022-01-20
Payer: COMMERCIAL

## 2022-01-20 VITALS
WEIGHT: 154.75 LBS | BODY MASS INDEX: 20.99 KG/M2 | DIASTOLIC BLOOD PRESSURE: 70 MMHG | HEART RATE: 89 BPM | SYSTOLIC BLOOD PRESSURE: 154 MMHG

## 2022-01-20 DIAGNOSIS — G31.84 MILD COGNITIVE IMPAIRMENT WITH MEMORY LOSS: ICD-10-CM

## 2022-01-20 DIAGNOSIS — F41.1 GENERALIZED ANXIETY DISORDER: ICD-10-CM

## 2022-01-20 DIAGNOSIS — R41.3 MEMORY DEFICITS: ICD-10-CM

## 2022-01-20 DIAGNOSIS — F33.1 MDD (MAJOR DEPRESSIVE DISORDER), RECURRENT EPISODE, MODERATE: ICD-10-CM

## 2022-01-20 DIAGNOSIS — F33.42 MDD (MAJOR DEPRESSIVE DISORDER), RECURRENT, IN FULL REMISSION: Primary | ICD-10-CM

## 2022-01-20 PROCEDURE — 99999 PR PBB SHADOW E&M-EST. PATIENT-LVL III: CPT | Mod: PBBFAC,,, | Performed by: PSYCHIATRY & NEUROLOGY

## 2022-01-20 PROCEDURE — 99214 OFFICE O/P EST MOD 30 MIN: CPT | Mod: S$GLB,,, | Performed by: PSYCHIATRY & NEUROLOGY

## 2022-01-20 PROCEDURE — 99999 PR PBB SHADOW E&M-EST. PATIENT-LVL III: ICD-10-PCS | Mod: PBBFAC,,, | Performed by: PSYCHIATRY & NEUROLOGY

## 2022-01-20 PROCEDURE — 99214 PR OFFICE/OUTPT VISIT, EST, LEVL IV, 30-39 MIN: ICD-10-PCS | Mod: S$GLB,,, | Performed by: PSYCHIATRY & NEUROLOGY

## 2022-01-20 RX ORDER — LACTOBACILLUS RHAMNOSUS GG 10B CELL
1 CAPSULE ORAL DAILY
COMMUNITY

## 2022-01-20 RX ORDER — MULTIVITAMIN
1 TABLET ORAL DAILY
COMMUNITY

## 2022-01-20 RX ORDER — DONEPEZIL HYDROCHLORIDE 10 MG/1
10 TABLET, FILM COATED ORAL NIGHTLY
Qty: 30 TABLET | Refills: 3 | Status: SHIPPED | OUTPATIENT
Start: 2022-01-20

## 2022-01-20 RX ORDER — DONEPEZIL HYDROCHLORIDE 5 MG/1
5 TABLET, FILM COATED ORAL NIGHTLY
Qty: 30 TABLET | Refills: 3 | Status: SHIPPED | OUTPATIENT
Start: 2022-01-20

## 2022-01-20 RX ORDER — ESCITALOPRAM OXALATE 20 MG/1
20 TABLET ORAL DAILY
Qty: 90 TABLET | Refills: 1 | Status: SHIPPED | OUTPATIENT
Start: 2022-01-20
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
## 2022-01-25 ENCOUNTER — PATIENT MESSAGE (OUTPATIENT)
Dept: PSYCHIATRY | Facility: CLINIC | Age: 87
End: 2022-01-25
Payer: COMMERCIAL

## 2022-01-26 ENCOUNTER — TELEPHONE (OUTPATIENT)
Dept: PSYCHIATRY | Facility: CLINIC | Age: 87
End: 2022-01-26
Payer: COMMERCIAL

## 2022-02-02 PROBLEM — G31.84 MILD COGNITIVE IMPAIRMENT WITH MEMORY LOSS: Status: ACTIVE | Noted: 2022-02-02

## 2022-06-10 ENCOUNTER — TELEPHONE (OUTPATIENT)
Dept: INTERNAL MEDICINE | Facility: CLINIC | Age: 87
End: 2022-06-10
Payer: COMMERCIAL

## 2022-06-10 NOTE — TELEPHONE ENCOUNTER
Contacted the   .Dr Navarrete at [phone].    No answer.    Left a message for a return call.

## 2022-06-10 NOTE — TELEPHONE ENCOUNTER
----- Message from Juliet Lopes sent at 6/10/2022 12:34 PM CDT -----  Contact: [employer] / [employer]  Home [phone]  Patient has passed away and they would like to know if you would sign the death certificate.  Family would like to cremate on Monday. He will send it through [employer] today.    Please call and advise.    Thank You

## 2022-06-10 NOTE — TELEPHONE ENCOUNTER
Is there a way to obtain information of the circumstances of his death.   I have not seen him since 4/21 and there is no info in EMR about recent care or issues.

## 2022-06-10 NOTE — TELEPHONE ENCOUNTER
----- Message from Juliet Lopes sent at 6/10/2022  2:50 PM CDT -----  Contact: [employer]  Home [phone]    .Dr Navarrete   [phone]    Please call and advise.    Thank You

## 2022-06-10 NOTE — TELEPHONE ENCOUNTER
Spoke with Dr. Navarrete who advises that on June 8th pt presented to [facility] with afib w/ RVR and hypotensive (85/48) family decided DNR.    Dr. Navarrete advises to call him again if needed.    Please advise if you can sign the death certificate.    Thank you.

## 2023-01-30 ENCOUNTER — PATIENT OUTREACH (OUTPATIENT)
Dept: ADMINISTRATIVE | Facility: HOSPITAL | Age: 88
End: 2023-01-30
Payer: COMMERCIAL

## (undated) DEVICE — SEE MEDLINE ITEM 152622

## (undated) DEVICE — SUT 3-0 12-18IN SILK

## (undated) DEVICE — SPONGE GAUZE 16PLY 4X4

## (undated) DEVICE — ADHESIVE MASTISOL VIAL 48/BX

## (undated) DEVICE — SUT MCRYL PLUS 4-0 PS2 27IN

## (undated) DEVICE — SUT 4-0 VICRYL / SH

## (undated) DEVICE — SHEET EENT SPLIT

## (undated) DEVICE — SUT SILK 3-0 BLK PS-2 18IN

## (undated) DEVICE — SUT VICRYL 3-0 27 SH

## (undated) DEVICE — EVACUATOR WOUND BULB 100CC

## (undated) DEVICE — SEE MEDLINE ITEM 157194

## (undated) DEVICE — GOWN SURGICAL X-LARGE

## (undated) DEVICE — STAPLER SKIN PROXIMATE WIDE

## (undated) DEVICE — SUT 2-0 12-18IN SILK

## (undated) DEVICE — ELECTRODE BLADE INSULATED 1 IN

## (undated) DEVICE — TRAY MINOR GEN SURG

## (undated) DEVICE — GAUZE SPONGE PEANUT STRL

## (undated) DEVICE — CORD BIPOLAR 12 FOOT

## (undated) DEVICE — DRAIN CHANNEL ROUND 10FR

## (undated) DEVICE — SYS CLSR DERMABOND PRINEO 22CM

## (undated) DEVICE — NDL HYPO REG 25G X 1 1/2

## (undated) DEVICE — ELECTRODE REM PLYHSV RETURN 9

## (undated) DEVICE — CONTAINER SPECIMEN STRL 4OZ

## (undated) DEVICE — COVER LIGHT HANDLE 80/CA

## (undated) DEVICE — SEE MEDLINE ITEM 157128

## (undated) DEVICE — SKINMARKER & RULER REGULAR X-F